# Patient Record
Sex: MALE | Race: WHITE | NOT HISPANIC OR LATINO | URBAN - METROPOLITAN AREA
[De-identification: names, ages, dates, MRNs, and addresses within clinical notes are randomized per-mention and may not be internally consistent; named-entity substitution may affect disease eponyms.]

---

## 2017-01-17 ENCOUNTER — COMMUNICATION - HEALTHEAST (OUTPATIENT)
Dept: NEUROLOGY | Facility: CLINIC | Age: 37
End: 2017-01-17

## 2017-01-17 ENCOUNTER — HOSPITAL ENCOUNTER (OUTPATIENT)
Dept: NEUROLOGY | Facility: CLINIC | Age: 37
Setting detail: THERAPIES SERIES
Discharge: STILL ADMITTED OR ADMINISTRATIVE | End: 2017-01-17
Attending: NURSE PRACTITIONER

## 2017-01-17 DIAGNOSIS — R11.2 NAUSEA AND VOMITING, INTRACTABILITY OF VOMITING NOT SPECIFIED, UNSPECIFIED VOMITING TYPE: ICD-10-CM

## 2017-01-17 DIAGNOSIS — R53.83 FATIGUE, UNSPECIFIED TYPE: ICD-10-CM

## 2017-01-17 DIAGNOSIS — H53.8 BLURRY VISION: ICD-10-CM

## 2017-01-17 DIAGNOSIS — F07.81 POSTCONCUSSION SYNDROME: ICD-10-CM

## 2017-01-17 DIAGNOSIS — F06.4 ANXIETY DISORDER DUE TO MEDICAL CONDITION: ICD-10-CM

## 2017-01-18 ENCOUNTER — HOSPITAL ENCOUNTER (OUTPATIENT)
Dept: NEUROLOGY | Facility: CLINIC | Age: 37
Setting detail: THERAPIES SERIES
Discharge: STILL ADMITTED OR ADMINISTRATIVE | End: 2017-01-18
Attending: NURSE PRACTITIONER

## 2017-01-18 DIAGNOSIS — S06.9X1S MILD NEUROCOGNITIVE DISORDER DUE TO TRAUMATIC BRAIN INJURY, WITH LOSS OF CONSCIOUSNESS OF 30 MINUTES OR LESS, SEQUELA: ICD-10-CM

## 2017-01-18 DIAGNOSIS — F07.81 POSTCONCUSSION SYNDROME: ICD-10-CM

## 2017-01-18 DIAGNOSIS — F43.23 ADJUSTMENT DISORDER WITH MIXED ANXIETY AND DEPRESSED MOOD: ICD-10-CM

## 2017-01-30 ENCOUNTER — HOSPITAL ENCOUNTER (OUTPATIENT)
Dept: PHYSICAL THERAPY | Age: 37
Setting detail: THERAPIES SERIES
Discharge: STILL ADMITTED OR ADMINISTRATIVE | End: 2017-01-30
Attending: NURSE PRACTITIONER

## 2017-01-30 ENCOUNTER — HOSPITAL ENCOUNTER (OUTPATIENT)
Dept: NEUROLOGY | Facility: CLINIC | Age: 37
Setting detail: THERAPIES SERIES
Discharge: STILL ADMITTED OR ADMINISTRATIVE | End: 2017-01-30

## 2017-01-30 ENCOUNTER — HOSPITAL ENCOUNTER (OUTPATIENT)
Dept: SPEECH THERAPY | Age: 37
Setting detail: THERAPIES SERIES
Discharge: STILL ADMITTED OR ADMINISTRATIVE | End: 2017-01-30
Attending: NURSE PRACTITIONER

## 2017-01-30 DIAGNOSIS — F07.81 POSTCONCUSSION SYNDROME: ICD-10-CM

## 2017-01-30 DIAGNOSIS — F43.23 ADJUSTMENT DISORDER WITH MIXED ANXIETY AND DEPRESSED MOOD: ICD-10-CM

## 2017-01-30 DIAGNOSIS — R47.89 WORD FINDING DIFFICULTY: ICD-10-CM

## 2017-02-06 ENCOUNTER — RECORDS - HEALTHEAST (OUTPATIENT)
Dept: ADMINISTRATIVE | Facility: OTHER | Age: 37
End: 2017-02-06

## 2017-02-08 ENCOUNTER — HOSPITAL ENCOUNTER (OUTPATIENT)
Dept: NEUROLOGY | Facility: CLINIC | Age: 37
Setting detail: THERAPIES SERIES
Discharge: STILL ADMITTED OR ADMINISTRATIVE | End: 2017-02-08
Attending: NURSE PRACTITIONER

## 2017-02-08 DIAGNOSIS — J45.909 ASTHMA: ICD-10-CM

## 2017-02-08 DIAGNOSIS — F06.4 ANXIETY DISORDER DUE TO MEDICAL CONDITION: ICD-10-CM

## 2017-02-08 DIAGNOSIS — R53.83 FATIGUE, UNSPECIFIED TYPE: ICD-10-CM

## 2017-02-08 DIAGNOSIS — J45.909 UNCOMPLICATED ASTHMA, UNSPECIFIED ASTHMA SEVERITY: ICD-10-CM

## 2017-02-08 DIAGNOSIS — F07.81 POSTCONCUSSION SYNDROME: ICD-10-CM

## 2017-03-10 ENCOUNTER — AMBULATORY - HEALTHEAST (OUTPATIENT)
Dept: INTERNAL MEDICINE | Facility: CLINIC | Age: 37
End: 2017-03-10

## 2017-03-10 ENCOUNTER — COMMUNICATION - HEALTHEAST (OUTPATIENT)
Dept: INTERNAL MEDICINE | Facility: CLINIC | Age: 37
End: 2017-03-10

## 2017-03-21 ENCOUNTER — AMBULATORY - HEALTHEAST (OUTPATIENT)
Dept: NEUROLOGY | Facility: CLINIC | Age: 37
End: 2017-03-21

## 2017-05-08 ENCOUNTER — COMMUNICATION - HEALTHEAST (OUTPATIENT)
Dept: INTERNAL MEDICINE | Facility: CLINIC | Age: 37
End: 2017-05-08

## 2017-05-08 DIAGNOSIS — J45.909 ASTHMA: ICD-10-CM

## 2017-05-25 ENCOUNTER — COMMUNICATION - HEALTHEAST (OUTPATIENT)
Dept: NEUROLOGY | Facility: CLINIC | Age: 37
End: 2017-05-25

## 2017-05-25 DIAGNOSIS — F32.A DEPRESSION, UNSPECIFIED DEPRESSION TYPE: ICD-10-CM

## 2017-07-10 ENCOUNTER — COMMUNICATION - HEALTHEAST (OUTPATIENT)
Dept: NEUROLOGY | Facility: CLINIC | Age: 37
End: 2017-07-10

## 2017-07-10 DIAGNOSIS — F32.A DEPRESSION, UNSPECIFIED DEPRESSION TYPE: ICD-10-CM

## 2017-07-13 ENCOUNTER — RECORDS - HEALTHEAST (OUTPATIENT)
Dept: ADMINISTRATIVE | Facility: OTHER | Age: 37
End: 2017-07-13

## 2017-07-18 ENCOUNTER — COMMUNICATION - HEALTHEAST (OUTPATIENT)
Dept: INTERNAL MEDICINE | Facility: CLINIC | Age: 37
End: 2017-07-18

## 2017-07-18 DIAGNOSIS — J45.909 ASTHMA: ICD-10-CM

## 2017-07-26 ENCOUNTER — OFFICE VISIT - HEALTHEAST (OUTPATIENT)
Dept: INTERNAL MEDICINE | Facility: CLINIC | Age: 37
End: 2017-07-26

## 2017-07-26 DIAGNOSIS — Z72.0 TOBACCO ABUSE: ICD-10-CM

## 2017-07-26 DIAGNOSIS — Z78.9 FEMALE-TO-MALE TRANSGENDER PERSON: ICD-10-CM

## 2017-07-26 DIAGNOSIS — M17.0 PRIMARY OSTEOARTHRITIS OF BOTH KNEES: ICD-10-CM

## 2017-07-26 DIAGNOSIS — F32.A DEPRESSION: ICD-10-CM

## 2017-07-26 DIAGNOSIS — J45.909 UNCOMPLICATED ASTHMA, UNSPECIFIED ASTHMA SEVERITY: ICD-10-CM

## 2017-07-26 ASSESSMENT — MIFFLIN-ST. JEOR: SCORE: 2001.71

## 2017-08-02 ENCOUNTER — COMMUNICATION - HEALTHEAST (OUTPATIENT)
Dept: INTERNAL MEDICINE | Facility: CLINIC | Age: 37
End: 2017-08-02

## 2017-08-07 ENCOUNTER — COMMUNICATION - HEALTHEAST (OUTPATIENT)
Dept: INTERNAL MEDICINE | Facility: CLINIC | Age: 37
End: 2017-08-07

## 2017-08-16 ENCOUNTER — RECORDS - HEALTHEAST (OUTPATIENT)
Dept: ADMINISTRATIVE | Facility: OTHER | Age: 37
End: 2017-08-16

## 2017-08-27 ENCOUNTER — COMMUNICATION - HEALTHEAST (OUTPATIENT)
Dept: INTERNAL MEDICINE | Facility: CLINIC | Age: 37
End: 2017-08-27

## 2017-08-27 DIAGNOSIS — J45.909 UNCOMPLICATED ASTHMA, UNSPECIFIED ASTHMA SEVERITY: ICD-10-CM

## 2017-09-15 ENCOUNTER — COMMUNICATION - HEALTHEAST (OUTPATIENT)
Dept: INTERNAL MEDICINE | Facility: CLINIC | Age: 37
End: 2017-09-15

## 2017-09-21 ENCOUNTER — COMMUNICATION - HEALTHEAST (OUTPATIENT)
Dept: INTERNAL MEDICINE | Facility: CLINIC | Age: 37
End: 2017-09-21

## 2017-09-28 ENCOUNTER — COMMUNICATION - HEALTHEAST (OUTPATIENT)
Dept: INTERNAL MEDICINE | Facility: CLINIC | Age: 37
End: 2017-09-28

## 2017-10-03 ENCOUNTER — COMMUNICATION - HEALTHEAST (OUTPATIENT)
Dept: INTERNAL MEDICINE | Facility: CLINIC | Age: 37
End: 2017-10-03

## 2017-10-03 DIAGNOSIS — J45.909 ASTHMA: ICD-10-CM

## 2017-10-07 ENCOUNTER — COMMUNICATION - HEALTHEAST (OUTPATIENT)
Dept: INTERNAL MEDICINE | Facility: CLINIC | Age: 37
End: 2017-10-07

## 2017-10-25 ENCOUNTER — COMMUNICATION - HEALTHEAST (OUTPATIENT)
Dept: INTERNAL MEDICINE | Facility: CLINIC | Age: 37
End: 2017-10-25

## 2017-10-25 ENCOUNTER — OFFICE VISIT - HEALTHEAST (OUTPATIENT)
Dept: INTERNAL MEDICINE | Facility: CLINIC | Age: 37
End: 2017-10-25

## 2017-10-25 DIAGNOSIS — R05.9 COUGH: ICD-10-CM

## 2017-10-25 DIAGNOSIS — J45.909 ASTHMA: ICD-10-CM

## 2017-10-25 DIAGNOSIS — J06.0 SORE THROAT AND LARYNGITIS: ICD-10-CM

## 2017-10-25 DIAGNOSIS — R11.0 NAUSEA: ICD-10-CM

## 2017-10-25 DIAGNOSIS — R09.82 POSTNASAL DRIP: ICD-10-CM

## 2017-11-02 ENCOUNTER — AMBULATORY - HEALTHEAST (OUTPATIENT)
Dept: INTERNAL MEDICINE | Facility: CLINIC | Age: 37
End: 2017-11-02

## 2017-11-02 ENCOUNTER — COMMUNICATION - HEALTHEAST (OUTPATIENT)
Dept: INTERNAL MEDICINE | Facility: CLINIC | Age: 37
End: 2017-11-02

## 2017-11-02 DIAGNOSIS — J45.901 ACUTE ASTHMA EXACERBATION: ICD-10-CM

## 2017-11-03 ENCOUNTER — AMBULATORY - HEALTHEAST (OUTPATIENT)
Dept: INTERNAL MEDICINE | Facility: CLINIC | Age: 37
End: 2017-11-03

## 2017-11-03 ENCOUNTER — OFFICE VISIT - HEALTHEAST (OUTPATIENT)
Dept: INTERNAL MEDICINE | Facility: CLINIC | Age: 37
End: 2017-11-03

## 2017-11-03 DIAGNOSIS — J45.901 ASTHMA EXACERBATION: ICD-10-CM

## 2017-11-03 DIAGNOSIS — J06.9 URI WITH COUGH AND CONGESTION: ICD-10-CM

## 2017-11-03 ASSESSMENT — MIFFLIN-ST. JEOR: SCORE: 2036.64

## 2017-11-06 ENCOUNTER — COMMUNICATION - HEALTHEAST (OUTPATIENT)
Dept: INTERNAL MEDICINE | Facility: CLINIC | Age: 37
End: 2017-11-06

## 2017-11-06 ENCOUNTER — RECORDS - HEALTHEAST (OUTPATIENT)
Dept: ADMINISTRATIVE | Facility: OTHER | Age: 37
End: 2017-11-06

## 2017-11-10 ENCOUNTER — COMMUNICATION - HEALTHEAST (OUTPATIENT)
Dept: INTERNAL MEDICINE | Facility: CLINIC | Age: 37
End: 2017-11-10

## 2017-11-10 ENCOUNTER — AMBULATORY - HEALTHEAST (OUTPATIENT)
Dept: INTERNAL MEDICINE | Facility: CLINIC | Age: 37
End: 2017-11-10

## 2017-11-10 DIAGNOSIS — J45.909 ASTHMA: ICD-10-CM

## 2018-01-13 ENCOUNTER — COMMUNICATION - HEALTHEAST (OUTPATIENT)
Dept: INTERNAL MEDICINE | Facility: CLINIC | Age: 38
End: 2018-01-13

## 2018-01-13 DIAGNOSIS — J45.909 ASTHMA: ICD-10-CM

## 2018-02-05 ENCOUNTER — COMMUNICATION - HEALTHEAST (OUTPATIENT)
Dept: INTERNAL MEDICINE | Facility: CLINIC | Age: 38
End: 2018-02-05

## 2018-02-06 ENCOUNTER — COMMUNICATION - HEALTHEAST (OUTPATIENT)
Dept: INTERNAL MEDICINE | Facility: CLINIC | Age: 38
End: 2018-02-06

## 2018-02-06 ENCOUNTER — OFFICE VISIT - HEALTHEAST (OUTPATIENT)
Dept: INTERNAL MEDICINE | Facility: CLINIC | Age: 38
End: 2018-02-06

## 2018-02-06 DIAGNOSIS — Z86.69 HISTORY OF MIGRAINE HEADACHES: ICD-10-CM

## 2018-02-06 DIAGNOSIS — M17.0 PRIMARY OSTEOARTHRITIS OF BOTH KNEES: ICD-10-CM

## 2018-02-06 DIAGNOSIS — E11.9 T2DM (TYPE 2 DIABETES MELLITUS) (H): ICD-10-CM

## 2018-02-06 DIAGNOSIS — J45.909 ASTHMA: ICD-10-CM

## 2018-02-06 DIAGNOSIS — G43.819 OTHER MIGRAINE WITHOUT STATUS MIGRAINOSUS, INTRACTABLE: ICD-10-CM

## 2018-02-06 LAB — HBA1C MFR BLD: 9.4 % (ref 3.5–6)

## 2018-02-11 ENCOUNTER — COMMUNICATION - HEALTHEAST (OUTPATIENT)
Dept: INTERNAL MEDICINE | Facility: CLINIC | Age: 38
End: 2018-02-11

## 2018-02-23 ENCOUNTER — OFFICE VISIT - HEALTHEAST (OUTPATIENT)
Dept: INTERNAL MEDICINE | Facility: CLINIC | Age: 38
End: 2018-02-23

## 2018-02-23 ENCOUNTER — COMMUNICATION - HEALTHEAST (OUTPATIENT)
Dept: SURGERY | Facility: CLINIC | Age: 38
End: 2018-02-23

## 2018-02-23 DIAGNOSIS — J45.909 ASTHMA: ICD-10-CM

## 2018-02-23 DIAGNOSIS — E11.9 TYPE 2 DIABETES MELLITUS WITHOUT COMPLICATION, WITHOUT LONG-TERM CURRENT USE OF INSULIN (H): ICD-10-CM

## 2018-02-23 LAB
CREAT UR-MCNC: 96.3 MG/DL
MICROALBUMIN UR-MCNC: 0.68 MG/DL (ref 0–1.99)
MICROALBUMIN/CREAT UR: 7.1 MG/G

## 2018-02-28 ENCOUNTER — COMMUNICATION - HEALTHEAST (OUTPATIENT)
Dept: INTERNAL MEDICINE | Facility: CLINIC | Age: 38
End: 2018-02-28

## 2018-03-02 ENCOUNTER — AMBULATORY - HEALTHEAST (OUTPATIENT)
Dept: EDUCATION SERVICES | Facility: CLINIC | Age: 38
End: 2018-03-02

## 2018-03-02 ENCOUNTER — COMMUNICATION - HEALTHEAST (OUTPATIENT)
Dept: ADMINISTRATIVE | Facility: CLINIC | Age: 38
End: 2018-03-02

## 2018-03-05 ENCOUNTER — COMMUNICATION - HEALTHEAST (OUTPATIENT)
Dept: INTERNAL MEDICINE | Facility: CLINIC | Age: 38
End: 2018-03-05

## 2018-03-05 DIAGNOSIS — F32.A DEPRESSION: ICD-10-CM

## 2018-05-15 ENCOUNTER — COMMUNICATION - HEALTHEAST (OUTPATIENT)
Dept: INTERNAL MEDICINE | Facility: CLINIC | Age: 38
End: 2018-05-15

## 2018-05-15 DIAGNOSIS — J45.909 ASTHMA: ICD-10-CM

## 2018-06-18 ENCOUNTER — COMMUNICATION - HEALTHEAST (OUTPATIENT)
Dept: INTERNAL MEDICINE | Facility: CLINIC | Age: 38
End: 2018-06-18

## 2018-06-25 ENCOUNTER — COMMUNICATION - HEALTHEAST (OUTPATIENT)
Dept: INTERNAL MEDICINE | Facility: CLINIC | Age: 38
End: 2018-06-25

## 2018-06-26 ENCOUNTER — COMMUNICATION - HEALTHEAST (OUTPATIENT)
Dept: INTERNAL MEDICINE | Facility: CLINIC | Age: 38
End: 2018-06-26

## 2018-06-29 ENCOUNTER — COMMUNICATION - HEALTHEAST (OUTPATIENT)
Dept: INTERNAL MEDICINE | Facility: CLINIC | Age: 38
End: 2018-06-29

## 2018-07-18 ENCOUNTER — COMMUNICATION - HEALTHEAST (OUTPATIENT)
Dept: INTERNAL MEDICINE | Facility: CLINIC | Age: 38
End: 2018-07-18

## 2018-07-25 ENCOUNTER — OFFICE VISIT - HEALTHEAST (OUTPATIENT)
Dept: INTERNAL MEDICINE | Facility: CLINIC | Age: 38
End: 2018-07-25

## 2018-07-25 DIAGNOSIS — E11.9 TYPE 2 DIABETES MELLITUS WITHOUT COMPLICATION, WITH LONG-TERM CURRENT USE OF INSULIN (H): ICD-10-CM

## 2018-07-25 DIAGNOSIS — J45.909 ASTHMA: ICD-10-CM

## 2018-07-25 DIAGNOSIS — T78.40XD ALLERGIC REACTION, SUBSEQUENT ENCOUNTER: ICD-10-CM

## 2018-07-25 DIAGNOSIS — E66.01 MORBID OBESITY (H): ICD-10-CM

## 2018-07-25 DIAGNOSIS — Z78.9 FEMALE-TO-MALE TRANSGENDER PERSON: ICD-10-CM

## 2018-07-25 DIAGNOSIS — Z79.4 TYPE 2 DIABETES MELLITUS WITHOUT COMPLICATION, WITH LONG-TERM CURRENT USE OF INSULIN (H): ICD-10-CM

## 2018-07-25 LAB — HBA1C MFR BLD: 7.2 % (ref 3.5–6)

## 2018-07-26 ENCOUNTER — COMMUNICATION - HEALTHEAST (OUTPATIENT)
Dept: INTERNAL MEDICINE | Facility: CLINIC | Age: 38
End: 2018-07-26

## 2018-07-26 DIAGNOSIS — E29.1 HYPOGONADISM MALE: ICD-10-CM

## 2018-07-30 ENCOUNTER — OFFICE VISIT - HEALTHEAST (OUTPATIENT)
Dept: ALLERGY | Facility: CLINIC | Age: 38
End: 2018-07-30

## 2018-07-30 DIAGNOSIS — T78.2XXA ANAPHYLACTIC REACTION: ICD-10-CM

## 2018-07-30 DIAGNOSIS — R23.2 FLUSHING: ICD-10-CM

## 2018-07-30 LAB — TSH SERPL DL<=0.005 MIU/L-ACNC: 1.08 UIU/ML (ref 0.3–5)

## 2018-07-30 ASSESSMENT — MIFFLIN-ST. JEOR: SCORE: 1959.53

## 2018-08-01 LAB — TRYPTASE - HISTORICAL: 4.7 UG/L

## 2018-08-03 ENCOUNTER — COMMUNICATION - HEALTHEAST (OUTPATIENT)
Dept: ALLERGY | Facility: CLINIC | Age: 38
End: 2018-08-03

## 2018-08-03 LAB
ARUP MISCELLANEOUS TEST: NORMAL
MISCELLANEOUS TEST DEPT. - HE HISTORICAL: NORMAL
PERFORMING LAB: NORMAL
SPECIMEN STATUS: NORMAL
TEST NAME: NORMAL

## 2018-08-07 ENCOUNTER — COMMUNICATION - HEALTHEAST (OUTPATIENT)
Dept: SCHEDULING | Facility: CLINIC | Age: 38
End: 2018-08-07

## 2018-08-08 ENCOUNTER — COMMUNICATION - HEALTHEAST (OUTPATIENT)
Dept: INTERNAL MEDICINE | Facility: CLINIC | Age: 38
End: 2018-08-08

## 2018-08-15 ENCOUNTER — AMBULATORY - HEALTHEAST (OUTPATIENT)
Dept: LAB | Facility: CLINIC | Age: 38
End: 2018-08-15

## 2018-08-15 DIAGNOSIS — R23.2 FLUSHING: ICD-10-CM

## 2018-08-15 DIAGNOSIS — T78.2XXA ANAPHYLACTIC REACTION: ICD-10-CM

## 2018-08-18 LAB
5HIAA & CREATININE UR-IMP: NORMAL
5OH-INDOLEACETATE 24H UR-MCNC: 2.8 MG/L
5OH-INDOLEACETATE 24H UR-MRATE: 2 MG/D (ref 0–15)
5OH-INDOLEACETATE/CREAT 24H UR: 2 MG/GCR (ref 0–14)
COLLECT DURATION TIME SPEC: 24 HR
CREAT 24H UR-MRATE: 1043 MG/D (ref 1000–2500)
CREAT UR-MCNC: 149 MG/DL
SPECIMEN VOL ?TM UR: 700 ML

## 2018-08-19 ENCOUNTER — COMMUNICATION - HEALTHEAST (OUTPATIENT)
Dept: ALLERGY | Facility: CLINIC | Age: 38
End: 2018-08-19

## 2018-08-21 LAB
MISCELLANEOUS TEST DEPT. - HE HISTORICAL: NORMAL
PERFORMING LAB: NORMAL
SPECIMEN STATUS: NORMAL
TEST NAME: NORMAL

## 2018-08-22 ENCOUNTER — COMMUNICATION - HEALTHEAST (OUTPATIENT)
Dept: ALLERGY | Facility: CLINIC | Age: 38
End: 2018-08-22

## 2018-08-27 ENCOUNTER — COMMUNICATION - HEALTHEAST (OUTPATIENT)
Dept: INTERNAL MEDICINE | Facility: CLINIC | Age: 38
End: 2018-08-27

## 2018-09-26 ENCOUNTER — COMMUNICATION - HEALTHEAST (OUTPATIENT)
Dept: INTERNAL MEDICINE | Facility: CLINIC | Age: 38
End: 2018-09-26

## 2018-09-26 DIAGNOSIS — J45.909 ASTHMA: ICD-10-CM

## 2018-09-27 ENCOUNTER — COMMUNICATION - HEALTHEAST (OUTPATIENT)
Dept: INTERNAL MEDICINE | Facility: CLINIC | Age: 38
End: 2018-09-27

## 2018-09-28 ENCOUNTER — COMMUNICATION - HEALTHEAST (OUTPATIENT)
Dept: INTERNAL MEDICINE | Facility: CLINIC | Age: 38
End: 2018-09-28

## 2019-04-04 ENCOUNTER — COMMUNICATION - HEALTHEAST (OUTPATIENT)
Dept: INTERNAL MEDICINE | Facility: CLINIC | Age: 39
End: 2019-04-04

## 2021-05-27 NOTE — TELEPHONE ENCOUNTER
Patient Returning Call  Reason for call:  Returning call from clinic  Information relayed to patient:  Due for diabetic check. Patient has moved to [place] and will be switching care elsewhere. Does not have new PCP yet but will get one eventually as [facility] is now too far for him.   Patient has additional questions:  No  If YES, what are your questions/concerns:  N/A  Okay to leave a detailed message?: No call back needed

## 2021-05-30 VITALS — WEIGHT: 248 LBS | BODY MASS INDEX: 40.03 KG/M2

## 2021-05-30 VITALS — WEIGHT: 247 LBS | BODY MASS INDEX: 39.87 KG/M2

## 2021-05-31 VITALS — BODY MASS INDEX: 41.95 KG/M2 | WEIGHT: 261 LBS | HEIGHT: 66 IN

## 2021-05-31 VITALS — BODY MASS INDEX: 42.14 KG/M2 | WEIGHT: 261.1 LBS

## 2021-05-31 VITALS — WEIGHT: 253.3 LBS | BODY MASS INDEX: 40.71 KG/M2 | HEIGHT: 66 IN

## 2021-06-01 VITALS — BODY MASS INDEX: 40.87 KG/M2 | WEIGHT: 253.19 LBS

## 2021-06-01 VITALS — BODY MASS INDEX: 39.21 KG/M2 | HEIGHT: 66 IN | WEIGHT: 244 LBS

## 2021-06-01 VITALS — WEIGHT: 255 LBS | BODY MASS INDEX: 41.16 KG/M2

## 2021-06-01 VITALS — BODY MASS INDEX: 41 KG/M2 | WEIGHT: 254 LBS

## 2021-06-01 VITALS — WEIGHT: 244 LBS | BODY MASS INDEX: 39.38 KG/M2

## 2021-06-08 NOTE — PROGRESS NOTES
Assessment:     1. Concussion, without loss of consciousness.    2. Post concussion syndrome  3. Dizziness  4.  Headaches    Plan:     Cognitive Impairment- Neuropsychological assessment (2 hours), rest, slow return to work, MRI, labs, start Wellbutrin  Pain control for headaches - Tylenol only due to rebound headaches  Dizziness and headache - PT to evaluate and treat  Vision abnormalities - PT to evaluate and treat, referral to opthalmology  Word Finding Problems- ST to evaluate and treat   Sleeping Problems-monitor, sleep hygiene, increase amitriptyline  Anxiety/depression - start Wellbutrin, referral to psychology  Return to Work- gradual, see note    Subjective:          Emily Chapin, prefers to be called Wilmer, is a 36 y.o. male who presents with a blunt/closed head injury which he sustained on 10/8/16.he went to [facility] with his nephew for the [facility] and while in the haunted house he tripped over a child in the dark. One of the employees tried to catch him but accidentally knocked over a large metal barrel that hit him squarely on the head. He affirms losing consciousness but thinks he woke within a few seconds, though he does not remember the incident at all.  He felt okay for the rest of the night though his head felt a bit sore. He felt a bit nauseous and unwell on Sunday with a mild headache. About 4 days later he presented to an urgency room after he had been feeling nauseous and vomiting frequently where he had a CT scan that revealed nothing of note. He was diagnosed with a concussion at the urgency room. Since he was discharged from the urgency room he has felt very poorly. He reports sleeping far more than usual, though he has difficulty falling asleep. Once he does sleep he will sleep until someone wakes him. He has fallen asleep at his desk at work, even after taking a week off to recuperate. He has been mixing up words and has daily head aches. He describes the headaches as  "splitting pain down the middle of his head with pain also over right crown of his head, and particular pain behind his right eye. However, the area over where he was hit does not feel terrible but he has a dent where he was hit. He states that his mood has been very capricious and he states that he has been irritable at work. He states that he can hear himself being unkind to customers He is worried about missing more work and his  has been on vacation. He denies any dizziness but when he tries to go to sleep he feels a \"shrinking sensation\" as if he is getting small as he is going to sleep. The sensation abates slowly if he opens his eyes. He used to have this sensation when he was a child but has not had it since then. Of note, he has been dreaming far more frequently and having some trouble distinguishing between what he dreamt and reality, providing an example of trying to feed his cat until he realized he did not have a cat. He has been taking OTC motion sickness medication with only minimal relief. He was prescribed Zofran by the urgent care room but could not afford it. He reports feeling sensitive to sound but denies any sensitivity to light. He endorses feeling down and depressed, as well as anxious. He has been crying without apparent cause and trembles when doing so  He denies any self harmful ideation and has no history of depression or anxiety. The point of impact was top right side of his head .He has had 0 previous head injuries. With regard to cognitive symptoms, he  endorsed significant ongoing concerns with his memory and that he has difficulties with attention and concentration as well as slowed thinking. In terms of emotional symptoms, he noted that in general he feels more emotional. He did acknowledge becoming more easily irritated and frustrated, he also reports feeling more sadness and nervousness. Finally, with regard to physical symptoms, he endorsed significant ongoing " "headaches noting that he has headaches intermittently, but does have a HA every day. He indicated that approximately twice a week  these headaches are particularly bad such that he would rate them as a 7 on a 1-10 rating scale. Most other days, he would describe his headaches as being at about a 3/10. At his best his headaches are a 0/10.  The patient reports screens, TV, phone brings on his headache symptoms, noise makes his symptoms worse, and rest, quiet room makes his symptoms better.. He indicated that he takes ibuprofen (Advil) and does help, but not always. He stated also that he experiences  nausea without vomiting, balance problems (no additional falls), worsen eye site, as well as blurred vision, fatigue, sensitivity to light and noise.. He also described sleep disturbance. He experiences drowsiness and is sleeping more than usual and has difficulty falling asleep and when he wakes he does not feel rested    Patient was referred to the concussion clinic by his primary. The patient was born in [place] and has lived here for most of his life. He is currently living  alone with his cat  in an apartment. 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 The concussion symptoms are limiting his ability to work, hard for him to figure out \"what he is spoze to be doing\" can't concentrate, severe HAs, fatigue, and word finding problems. His concussion is not work related. Personal interests include playing board games, drawing, and reading. The patient has been able to perform some of these activities since his injury. He normally does not exercise . He is a present smoker who smokes half pack a day and has for 10 years, never drinks, and does not drink products with caffeine. He is currently driving, concussion is not affecting his driving. His education includes GED and some college..He reports having average grades " through high school and college. He reports learning best by doing. He denies any developmental problems, learning disabilities, or history of ADHD. He does not have any culture or Yarsanism concerns regarding his treatment. The patient denies being a victim of physical, emotional, financial, or sexual abuse. There is not  currently any evidence of a blow to the head.     The patient did see his primary after the injury, who instructed the patient to not work for 2 weeks then half days. The patient reports that his activities since the injury have been resting he was out of work for 3 weeks.. The patient denies any unexplained weight loss or gain. The patient reports that the symptoms wake him up at night. He reports feeling down, depressed, and hopeless. The patient reports being bothered by having little interest or pleasure in doing things. He denies being currently pregnant or thinking he might be pregnant (patient is transsexual and on testosterone but has not had any surgery)    Physical Symptoms:  Headache-Yes  Nausea- Yes  Vomiting - Yes  Balance problems - Yes, resolved  Dizziness - Yes, resolved  Visual problems - Yes, vision gets blurry  Fatigue - Yes  Sensitivity to light - No  Sensitivity to sound - Yes  Numbness/tingling - No      Cognitive Symptoms  Feeling mentally foggy - Yes  Feeling slowed down - Yes  Difficulty Concentrating- Yes  Difficulty remembering - Yes    Emotional Symptoms  Irritability - Yes  Sadness- Yes  More emotional - Yes  Nervousness/anxiety - Yes      Psychiatric History:  Anxiety - No  Depression - No  Sleep Disorders - Yes, sleep apnea uses CPAP machine    Family Psychiatric History:  none      Sleep History:  Drowsiness- Yes  Sleep less than usual - No  Sleep more than usual - Yes  Trouble falling asleep - Yes  Does the patient wake feeling rested - No      Previous concussions  No       Headaches  The patient does not have any history of migraines, does state that his mother  does have a history of migraines    Patient Active Problem List    Diagnosis Date Noted     Postconcussion syndrome 10/20/2016     No past medical history on file.  No past surgical history on file.  No family history on file.  Current Outpatient Prescriptions   Medication Sig Dispense Refill     albuterol sulfate 90 mcg/actuation AePB Inhale 180 mcg every 4 (four) hours as needed.       amitriptyline (ELAVIL) 25 MG tablet Take 1 tablet (25 mg total) by mouth bedtime. 30 tablet 3     ibuprofen (ADVIL,MOTRIN) 200 MG tablet Take 200 mg by mouth every 6 (six) hours as needed for pain.       mometasone-formoterol (DULERA) 200-5 mcg/actuation HFAA inhaler Inhale 2 puffs 2 (two) times a day.       RANITIDINE HCL (ZANTAC ORAL) Take by mouth as needed.       TESTOSTERONE CYPIONATE IM Inject into the shoulder, thigh, or buttocks. Every 2 weeks       No current facility-administered medications for this encounter.        Allergies   Allergen Reactions     Morphine Nausea And Vomiting     Prednisone Anxiety     Social History     Social History     Marital status: Single     Spouse name: N/A     Number of children: N/A     Years of education: N/A     Occupational History     Not on file.     Social History Main Topics     Smoking status: Current Every Day Smoker     Types: Cigarettes     Smokeless tobacco: Not on file      Comment: 1/2 PACK PER DAY     Alcohol use Not on file     Drug use: Not on file     Sexual activity: Not on file     Other Topics Concern     Not on file     Social History Narrative     No narrative on file       The following portions of the patient's history were reviewed and updated as appropriate: allergies, current medications, past family history, past medical history, past social history, past surgical history and problem list.    Review of Systems  A comprehensive review of systems was negative except for: Neurological: positive for headaches and fatigue       Objective:     Cranial Nerve Exam:  I -  intact      II - intact      III- intact      IV - intact      V - intact      VI - intact      VII- intact     VIII - intact      IX - intact     X- intact     XI intact     XII - intact      Neuro Exam:  Sensation -equal/intact     Muscle strength - equal/intact  Gait- normal    Abstract thinking - intact  Thinking ability was intact   Romberg test-  positive    Vitals:    01/17/17 0809   BP: 128/82   Pulse: 93   Weight: (!) 247 lb (112 kg)       Exam: On examination, he is awake and alert with no aphasia and no dysarthria. He is oriented X 3 and shows good remote and recall. Attention is fine. Cranial nerves II through XII are tested and intact. Fundi are normal and there is no carotid bruit. There is no pronator drift and no focal or lateralized weakness in the extremities. Finger-nose-finger testing, fine finger movements, and rapid alternating movements are normal on both sides. Tone and sensation are fine. Reflexes are normal and symmetric throughout. He is able to walk on his heels and toes just fine. .     Imaging:  CT Head: Reviewed    Discussion was held with the patient today regarding concussion in general including types of injury, symptoms that are common, treatment and variability in time to recover. I also provided written information about concussion and symptoms that would apply to him in his AVS paperwork. Education about concussion symptoms and length of time it would take the patient to recover was also given to the patient.  I have reassured the patient his symptoms are very common when a concussion is present and will improve with time. I asked him to call with any questions or concerns and will see him again in clinic in about 2   weeks. We discussed the risks and benefits of the medication including risk of worsening depression with medication adjustments and even the possibility of emergence of suicidality      Total time spent with the patient today was 65 minutes with greater than 50% of  the time spent in counseling and care coordination.     Physical Exam: General appearance: alert, appears stated age, cooperative and no distress. No sensitivity to light.  Head: Normocephalic, without obvious abnormality, atraumatic  Neck: Full ROM with some pain  Neurologic: Mental status: Alert, oriented, thought content appropriate, affect: mood-congruent. Recent and remote memory grossly intact. Speech is clear and fluent with no obvious word finding or paraphasic errors. Pupils are equal and react to light direct and consensual accommodation. EOMs are intact without nystagmus. No exophoria/exotropia noted. Visual fields are grossly full. VOR grossly intact. Saccade and smooth pursuit grossly intact. Full facial movements, tongue protrudes midline soft palate rises symmetrically. Shoulder shrug is intact. Strength is 5/5, No pronator drift. Accurate finger to nose and sensation is intact to double simultaneous stimulation. Reflexes are symmetrical, toes are down going. Romberg is negative.He is able to toe, heel, and tandem walk without difficulty.      Mental Status Examination  Patient is casually dressed and seated for evaluation. He is cooperative with questioning and eye contact is good. He is fully engaged in conversation today. He is alert and fully oriented. Speech is normal. Thought processes normal with normal prehension and expression. Thoughts are organized and linear. Content is pertinent to the conversation and without evidence of auditory or visual hallucinations. No delusional ideation. Affect/mood is euthymic-bright, even. Gen. fund of knowledge, insight and memory are normal

## 2021-06-08 NOTE — PROGRESS NOTES
How have you been doing since we last saw you? any concerns?( new pt. Ask how concussion happen?) injure 10/8/2016 @ [facility]. Having ongoing headaches and memory issue.       Current Symptoms : Yes

## 2021-06-08 NOTE — PROGRESS NOTES
"Physical Therapy  PHYSICAL THERAPY INITIAL CONCUSSION ASSESSMENT    Pt goes by Wilmer    Date: 1/30/2017                        Date of Injury: 10/8/2016  Subjective: had been visiting [place] in October with his sister and nephew (per records October 8th) when he \"tripped over a small child,\" tried to grab onto a nearby barrel to steady himself but still fell to the ground and the empty metal barrel fell on top of him striking the top of his head. His sister indicated that he sustained a brief loss of consciousness (less than 30 seconds) and when he came to, he indicated that initially his head hurt, but that he otherwise felt \"fine\". However, he described later that evening experiencing a \"massive headache.\"     As symptoms persisted, Wilmer presented to urgent care on October 13th and a CT head scan was conducted at the time and was read as unremarkable (per Care Everywhere). He was told to follow up with his primary care provider which he did on October 20th. At that time, amitriptyline and Reglan were prescribed for headaches and nausea respectively. His PCP also recommended follow-up in the [facility] concussion clinic Wilmer stated that his doctor then recommended that he be held off work for 2 weeks followed by a period of part-time work at 4 hours a day. However, when he attempted to return after 2 weeks, he was told that his job was no longer available. As, at this point he no longer had health insurance, he was not able to follow-up in our concussion clinic until recently.  Pt currently works at a call center as a .  Pt was FT but recently Carmita modified his work scheduled to 6hrs/day, 4 days/wk.  Pt feels this is helping and feels more rested with the ability to concentrate better when at work.    Ongoing symptoms:  Headaches and nausea    Headaches:  Constant.  Located along the sagittal suture line of the head that pierces down the middle of the face and cuts off to the " "right by the jaw line.  Describes pain as \"head is going to pop like a popcorn kernel\".  Exacerbating factors:  Sound, noise, light, screen time.  Relieving factors:  Pain meds (tyelonol), sleep, rest.  Current:  1/10, Best:  1/10, Worst:  7/10  Cervical:  Denies  Dizzy:  No significant complaints, \"maybe once ever in a great while.\"  Balance:  No complaints    Pain rating: See above  Location: See above   Shoulder Clear? No  Other information/data: PMH that includes DM type II, suzette knee OA (already has been told he needs suzette TKA due to \"bone on bone\"), YULIET.  Pt is transgender and had a total hysterectomy.      ROM: Cervical       [x] WFL; pt noted stiffness with R side bending but no pain.  AROM is WFL.    Cervical and Thoracic Segmental Mobility/Other:  NT; not indicated  Postural Assessment: Rounded shoulders, increased muscle/tissue mass around shoulders and neck    Vestibular/Balance  Gait:Normal      Vertebral Basilar Artery: NT; not indicated   Ocular AROM: Normal  Smooth Pursuit: Normal  Saccades: Normal    Positional Tests: R Dee-Hallpike NT; not indicated                                              L Dee--Hallpike NT; indicated      Convergence: 9 cm  VOR Cancellation: Normal  Slow VOR: Normal  Right Head Impulse Test: Normal  Left Head Impulse Test: Normal    Sensory Organization Tests:  MCTSIB: NSEO Normal       PSEO Normal       NSEC Normal                  PSEC Normal    (Functional Gait Assessment) FGA: Not Tested            Initial Treatment: Pt reviewed findings of PT eval with pt and discussed that there does not appear to be a musculoskeletal presentation to the pt's HA and thus, does not warrant 1:1 PT for his complaints.  Pt verbalized understanding.    Therapist Recommendations:  Screen WFL; no need for 1:1 PT. Discharge Patient      Rx Units EVAL  Total Minutes: 35                                              "

## 2021-06-08 NOTE — PROGRESS NOTES
Assessment:     1. Concussion, without loss of consciousness.    2. Post concussion syndrome  3. Dizziness  4.  Headaches    Plan:     Cognitive Impairment- continue with Wellbutrin, structured day  Pain control for headaches - Tylenol only due to rebound headaches  Dizziness and headache - discharged by PT  Vision abnormalities -  referral to opthalmology  Sleeping Problems-monitor, sleep hygiene,  amitriptyline  Anxiety/depression - continue Wellbutrin, start Venlafaxine, continue with psychology  Return to Work- gradual, see note    The patient returns to the concussion clinic for a follow up visit, he was last seen by me on 1/17/17, I started the patient on Wellbutrin and Amitriptyline. The patient did admit himself to the ER, due to suicidal thoughts. The patient did loose his job due to his concussion symptoms. He reports that he no longer has these thoughts. I did discuss with the patient that we are not a mental health clinic, we are only a concussion clinic. The patient verbalized understanding. He reports his HAs are not as bad, they are no longer continuous. The patient is transsexual and has not been taking his hormones, which maybe contributing to his symptoms. I did start the patient on Venlafaxine. The patient also reports that he will see his endodontologist for his hormones. I did lower his amitriptyline due to grogginess. The patient is still working with psychology. The patient is denying suicidal ideations, I did discuss with the patient how he should go to an ER if ever having these thoughts, patient verbalized understanding.     Subjective:          Emily Chapin, prefers to be called [facility], is a 36 y.o. male who initially presented to the concussion clinic with a blunt/closed head injury which he sustained on 10/8/16, he went to [facility] with his nephew for the [facility] extravagance and while in the haunted house he tripped over a child in the dark. One of the employees tried to catch  "him but accidentally knocked over a large metal barrel that hit him squarely on the head. He affirms losing consciousness but thinks he woke within a few seconds, though he does not remember the incident at all.  He felt okay for the rest of the night though his head felt a bit sore. He felt a bit nauseous and unwell on Sunday with a mild headache. About 4 days later he presented to an urgency room after he had been feeling nauseous and vomiting frequently where he had a CT scan that revealed nothing of note. He was diagnosed with a concussion at the urgency room. Since he was discharged from the urgency room he has felt very poorly. He reports sleeping far more than usual, though he has difficulty falling asleep. Once he does sleep he will sleep until someone wakes him. He has fallen asleep at his desk at work, even after taking a week off to recuperate. He has been mixing up words and has daily head aches. He describes the headaches as splitting pain down the middle of his head with pain also over right crown of his head, and particular pain behind his right eye. However, the area over where he was hit does not feel terrible but he has a dent where he was hit. He states that his mood has been very capricious and he states that he has been irritable at work. He states that he can hear himself being unkind to customers He is worried about missing more work and his  has been on vacation. He denies any dizziness but when he tries to go to sleep he feels a \"shrinking sensation\" as if he is getting small as he is going to sleep. The sensation abates slowly if he opens his eyes. He used to have this sensation when he was a child but has not had it since then. He had been dreaming far more frequently and having some trouble distinguishing between what he dreamt and reality, providing an example of trying to feed his cat until he realized he did not have a cat. He had been taking OTC motion sickness " medication with only minimal relief. He was prescribed Zofran by the urgent care room but could not afford it. He reports feeling sensitive to sound but denies any sensitivity to light. He endorses feeling down and depressed, as well as anxious. He has been crying without apparent cause and trembles when doing so  He denies any self harmful ideation and has no history of depression or anxiety. The point of impact was top right side of his head .No previous head injuries. Cognitive symptoms, ongoing concerns with his memory, difficulties with attention and concentration, slowed thinking. Emotional symptoms, felt more emotional, more easily irritated and frustrated, feeling more sadness and nervousness. He had HAs intermittently, but did have a HA every day, twice a week  these HAs were at their worst 7/10, average 3/10, best 0/10.  The patient reports screens, TV, and phone brings on his HAs, noise makes his HAs worse, and rest, quiet room made his HAs better.. He indicated that he takes ibuprofen (Advil) and does help, but not always. He stated also that he experiences  nausea without vomiting, balance problems (no additional falls), worsen eye site, as well as blurred vision, fatigue, sensitivity to light and noise.. He also described sleep disturbance. He experiences drowsiness and is sleeping more than usual and has difficulty falling asleep and when he wakes he does not feel rested        Patient Active Problem List    Diagnosis Date Noted     Postconcussion syndrome 10/20/2016     No past medical history on file.  No past surgical history on file.  No family history on file.  Current Outpatient Prescriptions   Medication Sig Dispense Refill     albuterol sulfate 90 mcg/actuation AePB Inhale 180 mcg every 4 (four) hours as needed. 180 each 3     buPROPion (WELLBUTRIN XL) 150 MG 24 hr tablet Take 1 tablet (150 mg total) by mouth daily. 31 tablet 1     diclofenac sodium (VOLTAREN) 1 % Gel PJ 4 GRAMS TO KNEE QID  PRN P.  0     HYDROcodone-acetaminophen 5-325 mg per tablet Take 1 tablet by mouth.       ibuprofen (ADVIL,MOTRIN) 200 MG tablet Take 200 mg by mouth every 6 (six) hours as needed for pain.       mometasone-formoterol (DULERA) 200-5 mcg/actuation HFAA inhaler Inhale 2 puffs 2 (two) times a day.       RANITIDINE HCL (ZANTAC ORAL) Take by mouth as needed.       TESTOSTERONE CYPIONATE IM Inject into the shoulder, thigh, or buttocks. Every 2 weeks       VENTOLIN HFA 90 mcg/actuation inhaler INL 2 PFS PO Q 4 H PRN  2     amitriptyline (ELAVIL) 10 MG tablet Take 1 tablet (10 mg total) by mouth bedtime. 30 tablet 3     venlafaxine (EFFEXOR XR) 37.5 MG 24 hr capsule Take 1 capsule (37.5 mg total) by mouth daily. 60 capsule 1     No current facility-administered medications for this encounter.        Allergies   Allergen Reactions     Morphine Nausea And Vomiting     Prednisone Anxiety     Social History     Social History     Marital status: Single     Spouse name: N/A     Number of children: N/A     Years of education: N/A     Occupational History     Not on file.     Social History Main Topics     Smoking status: Current Every Day Smoker     Types: Cigarettes     Smokeless tobacco: Not on file      Comment: 1/2 PACK PER DAY     Alcohol use Not on file     Drug use: Not on file     Sexual activity: Not on file     Other Topics Concern     Not on file     Social History Narrative     No narrative on file       The following portions of the patient's history were reviewed and updated as appropriate: allergies, current medications, past family history, past medical history, past social history, past surgical history and problem list.    Review of Systems  A comprehensive review of systems was negative except for: Neurological: positive for headaches and fatigue       Objective:     Neuro Exam:  Sensation -equal/intact     Muscle strength - equal/intact  Gait- normal    Abstract thinking - intact  Thinking ability was intact    Romberg test-  positive    Vitals:    02/08/17 1256   BP: 116/63   Pulse: 87   Weight: (!) 248 lb (112.5 kg)         Discussion was held with the patient today regarding concussion in general including types of injury, symptoms that are common, treatment and variability in time to recover. I also provided written information about concussion and symptoms that would apply to him in his AVS paperwork. Education about concussion symptoms and length of time it would take the patient to recover was also given to the patient.  I have reassured the patient his symptoms are very common when a concussion is present and will improve with time. I asked him to call with any questions or concerns and will see him again in clinic in about 2   weeks. We discussed the risks and benefits of the medication including risk of worsening depression with medication adjustments and even the possibility of emergence of suicidality      Total time spent with the patient today was 65 minutes with greater than 50% of the time spent in counseling and care coordination.     Physical Exam: General appearance: alert, appears stated age, cooperative and no distress. No sensitivity to light.  Head: Normocephalic, without obvious abnormality, atraumatic  Neck: Full ROM with some pain  Neurologic: Mental status: Alert, oriented, thought content appropriate, affect: mood-congruent. Recent and remote memory grossly intact. Speech is clear and fluent with no obvious word finding or paraphasic errors. Pupils are equal and react to light direct and consensual accommodation. EOMs are intact without nystagmus. No exophoria/exotropia noted. Visual fields are grossly full. VOR grossly intact. Saccade and smooth pursuit grossly intact. Full facial movements, tongue protrudes midline soft palate rises symmetrically. Shoulder shrug is intact. Strength is 5/5, No pronator drift. Accurate finger to nose and sensation is intact to double simultaneous stimulation.  Reflexes are symmetrical, toes are down going. Romberg is negative.He is able to toe, heel, and tandem walk without difficulty.      Mental Status Examination  Patient is casually dressed and seated for evaluation. He is cooperative with questioning and eye contact is good. He is fully engaged in conversation today. He is alert and fully oriented. Speech is normal. Thought processes normal with normal prehension and expression. Thoughts are organized and linear. Content is pertinent to the conversation and without evidence of auditory or visual hallucinations. No delusional ideation. Affect/mood is euthymic-bright, even. Gen. fund of knowledge, insight and memory are normal

## 2021-06-08 NOTE — PROGRESS NOTES
How have you been doing since we last saw you? any concerns?( new pt. Ask how concussion happen?) no concerns,       Current Symptoms : Yes light sensitive

## 2021-06-08 NOTE — PROGRESS NOTES
"Initial Psychology Consultation (Standard)    Date of Service:  2017  Duration: 1 hour (10:00 AM - 11:00 AM)    Name:  Emily Chapin \"Kirk\"  :  1980  MRN:  [id number]    REASON FOR REFERRAL:  Mr. Chapin is a 36 y.o., single, , transgender male who is being seen for an evaluation of cognitive, emotional, and behavioral functioning at the request of Carmita Liz CNP. Collateral information was obtained from a brief review of the medical chart. Patient was informed of the role of psychology services in patient's care, the foreseeable risks and benefits, and the limits of confidentiality, and the patient agreed to proceed.    Family Involvement: Patient declined. Patient was seen individually without family present.    HISTORY OF PRESENTING PROBLEM:   According to the medical chart, on 10/8/16 patient was at [facility] (\"[facility]\") with his nephew when he tripped on a child in the dark. An employee tried to catch him, but accidentally knocked over a large metal barrel that then hit patient on the head. He reportedly experienced LOC for a few seconds and reported feeling nauseous and having a headache the next day. He was seen at the Emergency Room 4 days later on 10/13/16 and was diagnosed with a concussion. He reportedly had ongoing symptoms including headache and nausea. Patient was reportedly out of work for 3 weeks and when he tried to return, he was told the job was no longer available. Patient therefore had no insurance and was unable to follow-up with the [facility] at the recommendation of his PCP. Patient was seen for initial consult by Carmita Liz CNP on 17 after beginning a new job and holding new insurance. He reported ongoing symptoms including headaches that worsened with the beginning of his new job. He also reported sleeping more than usual, but having difficulty falling asleep. He reported being more irritable, dreaming more frequently, and experiencing " "anxiety and depressed mood. Patient was referred to psychology for evaluation and treatment of exacerbation of emotional symptoms following concussion.    Upon current interview, patient reported that he was doing relatively well when he began his new job at the end of November. He was in training for 5 weeks, during which time he did not report experiencing any postconcussive symptoms. 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 He noted that [employer] recently cut his hours to 6-hour shifts 4 days per week, which has been helping with postconcussive symptoms, but adding to financial stress/worry. Patient indicated that he has experienced emotional lability, stating that he can \"cry at the drop of a hat,\" while he did not premorbidly identify as an emotional person. He described his mood in general as \"eh,\" noting little energy and motivation to engage in usually pleasurable activities. He shared an example, noting that typically he gets together with friends 1-2 times per week, but that he has been less motivated to do this recently both because of fatigue as well as not being able to spend extra money. Patient stated that normally he keeps up with the housework, but has found it difficult to do this as of late. Patient also reported difficulty sleeping. He indicated that he has obstructive sleep apnea (YULIET), which has been well-controlled with the use of a CPAP. However, since the concussion, patient has been sleeping approximately 5 hours on average, being in bed from 11:00 PM to 7:00/7:30 AM, but having significant difficulty falling asleep. Patient reported a premorbid history of mild depression and anxiety, but noted that these were manageable. He had been seen in psychotherapy at approximately age 17 " "related to transgender issues, but did not continue this long-term.     Of note, patient reported that he had previously been receiving hormone replacement therapy (HRT), but due to insurance reasons has been off testosterone for approximately 6 months. He noted that a recent discussion with the speech therapist prompted him to gain curiosity regarding the impact of hormones on cognition and mood. Patient had previously been seen by the [facility] who recommended various endocrinologists. He indicated that he was evaluated by a psychologist prior to beginning HRT, but is concerned that he will need to restart this process before receiving HRT again.     Please see [facility] consult note dated 1/17/17 as well as neuropsychological evaluation report dated 1/18/17 for additional information regarding medical, psychosocial, and psychiatric history.    No non-personal contextual factors are reported.  No standardized assessment tools were administered; patient declined.    PSYCHIATRIC HISTORY:  Depressive Symptoms:  Patient reported current depressive symptoms including anhedonia, amotivation, emotional lability, fatigue, difficulty concentrating, and depressed mood. He reported a history of mild depressive symptoms related to life stressors (e.g., gender transition), but that this has been well-managed.    Manic Symptoms:  Patient denied a history of ari.    Anxiety Symptoms:  Patient reported current anxiety, particularly related to financial stressors. He noted a history of \"a constant sense of dread\" and constant worry, but noted that this has been under control for many years. He denied any formal diagnosis or treatment for anxiety.     Panic Symptoms:  Patient reported experiencing one panic attack in his life that occurred approximately 3 years ago. He noted that his brother took him to the Emergency Room, because he thought he was dying. He has not experienced any " "other panic attacks prior to or since that time.    PTSD Symptoms:  Patient denied a history of traumatic events or associated symptoms.     Obsessive Compulsive Symptoms:  Patient denied a history of obsessive rituals or compulsions.     Psychotic Symptoms:  Patient denied a history of psychosis including hallucinations or delusions. He reported that he occasionally hears unsubstantiated sounds as well as experiences \"[place] syndrome\" (i.e., feels as if he is shrinking or growing) when falling asleep, but attributed this to a possible inner ear problem. He noted that this only occurs approximately 3-4 times per year and does not cause clinically significant distress.    Cognitive Symptoms:  As noted above, patient reported current concerns related to attention/concentration. He denied a history of premorbid cognitive concerns.    Mental Health Treatment History:  Patient reported that his parents enrolled him in psychotherapy at the [facility] when he was approximately age 17. However, he noted that their impression was that this was for conversion therapy, but when they found out that this was not the case, they removed him from the program. Patient denied any additional history of mental health treatment including psychotherapy or medication management. Patient denied a history of inpatient psychiatric hospitalizations, suicide attempts, or suicidal ideation. Since the concussion, patient has been prescribed Wellbutrin and amitriptyline, although he indicated that he does not like taking amitriptyline due to feeling groggy and having difficulty waking up in the morning.    SUBSTANCE USE HISTORY:  Alcohol Abuse/Dependence:   Patient reported that he has never been a big drinker, but discontinued drinking completely after being diagnosed with diabetes in August 2016.    A CAGE Questionnaire was not administered secondary to absence of reported problematic " substance use.    Drug Abuse/Dependence:  Patient denied a history of illicit drug use or prescription medication misuse.    Tobacco Use:  Patient has been a smoker for approximately 10 years and smokes 1/2 ppd. He indicated that he is interested in quitting, but that it is currently helping him to manage stress.    Other Addictive Behavior(s):   None reported. Patient denied using caffeine.    Chemical Dependency Treatment History:  Patient denied a history of chemical dependency treatment or perceived need for treatment.    MEDICAL HISTORY:  Patient's medical history is notable for obstructive sleep apnea (well-managed with CPAP) and diabetes diagnosed August 2016. He indicated that he has been primarily managing this with diet. Patient is also s/p total hysterectomy and oophorectomy. He noted that he was previously receiving hormone replacement therapy (HRT), but has not received this for approximately 6 months due to insurance issues.      The patient does not report cultural factors impacting pursuit of care. The patient pursues standard Western medical care. Patient demonstrates adequate awareness and understanding of current medical and mental health conditions.     Current Medications Include:    Current Outpatient Prescriptions   Medication Sig Note     albuterol sulfate 90 mcg/actuation AePB Inhale 180 mcg every 4 (four) hours as needed.      amitriptyline (ELAVIL) 25 MG tablet Take 1 tablet (25 mg total) by mouth bedtime.      buPROPion (WELLBUTRIN XL) 150 MG 24 hr tablet Take 1 tablet (150 mg total) by mouth daily.      diclofenac sodium (VOLTAREN) 1 % Gel PJ 4 GRAMS TO KNEE QID PRN P. 1/17/2017: Received from: External Pharmacy     ibuprofen (ADVIL,MOTRIN) 200 MG tablet Take 200 mg by mouth every 6 (six) hours as needed for pain.      metoclopramide (REGLAN) 5 MG tablet Take 1 tablet (5 mg total) by mouth 3 (three) times a day as needed for nausea.      mometasone-formoterol (DULERA) 200-5  "mcg/actuation HFAA inhaler Inhale 2 puffs 2 (two) times a day.      ondansetron (ZOFRAN-ODT) 4 MG disintegrating tablet DIS ONE T PO  Q 8 H IF NEEDED NV 1/17/2017: Received from: External Pharmacy     RANITIDINE HCL (ZANTAC ORAL) Take by mouth as needed.      TESTOSTERONE CYPIONATE IM Inject into the shoulder, thigh, or buttocks. Every 2 weeks      VENTOLIN HFA 90 mcg/actuation inhaler INL 2 PFS PO Q 4 H PRN 1/17/2017: Received from: External Pharmacy       FAMILY MEDICAL/PSYCHIATRIC HISTORY:  Patient reported a history of heart disease on his father's side of the family. He indicated that he does not know his biological mother, therefore does not know any family history from her side. He denied any known family history of mental health or chemical health concerns.    SOCIAL HISTORY:   Family of Origin:   Patient reported that he was born and raised near [place], [place] with his father and stepmother. He has a stepbrother and a good friend who he calls his \"sister.\" Patient also has several half siblings, but indicated that he does not know them. Patient indicated that he is not close with his parents due to discord during his coming out and transgender process. He denied a history of physical, sexual and emotional abuse.    Educational/Developmental History:  Patient completed his GED and some college. He attended [employer], but did not complete a degree due to it becoming too expensive. He reported receiving average grades and denied a history of learning disabilities or ADHD. 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 However, he was fired from that position following the concussion. Patient has now been working at a new call center since the end of November.     Socioeconomic Status:   Patient reported general " "financial concerns due to recently reducing his hours at work.    Marital/Relationship History:    Patient has never been  and has no children.    Current Living Situation:    Patient currently lives alone with a cat in an apartment in [place], [place].    Social Support/Hobbies & Interests:    Patient reported enjoying board games, drawing, and reading.    Spiritual/Cultural Beliefs:    Patient reported that he is an Atheist and \"believes in science.\"    LEGAL HISTORY:  Patient reported that he was once arrested at age 22 or 23 due to having a warrant out for his arrest. He later found out that this was due to an unpaid $10 fine that he was unaware of due to letters being sent to a previous address. He indicated that this charge was dropped to a misdemeanor and he denied any additional history.    MENTAL STATUS EXAM:    Behavior toward examiner: cooperative, pleasant  Mood: depressed  Affect: flat, depressed, reported lability although not evidenced in session  Motor Activity: unremarkable  Suicidal Ideation: expressly denied  Homicidal Ideation: expressly denied  Thought process: logical, goal-directed  Thought content: within normal limits, no hallucinations or delusions noted  Fund of Knowledge: sufficient  Attention/Concentration: intact for purposes of current interview, reported some difficulty particularly at work  Language ability: intact  Speech: within normal limits  Memory: generally intact, although not formally assessed  Insight and Judgement: good  Orientation: appeared oriented to person, place, situation, and time, although orientation was not formally assessed  Appearance: casually dressed, adequately groomed  Eye Contact: appropriate  Estimated IQ: average    CLINICAL SUMMARY:    Patient is a 36 y.o., single, , transgender male who sustained a concussion on 10/8/16 after tripping and a barrel falling on his head. He initially noted feeling nauseous and having a headache. He was " referred to the [facility] by his PCP, although lost his job and insurance after being out of work for 3 weeks. Therefore, patient was unable to obtain medical care until he recently began a new job and obtained new insurance through [employer]. With regard to postconcussive symptoms, patient asserted that he was doing well after beginning his new job at the end of November 2016, as he spent 5 weeks in training. However, when he began full job duties, he experienced an exacerbation in symptoms including headache, difficulty concentrating, sensitivity to light and computer screens, and nausea associated with increased headache intensity. He has also experienced increased emotional lability, fatigue, depressed mood, anxiety, irritability, and difficulty sleeping. He reported a history of psychotherapy in his late teens related to transgender issues, but denied any additional premorbid history of psychiatric concerns or treatment. Additionally, patient reported that he was previously receiving hormone replacement therapy (HRT), but has not received this in approximately 6 months due to insurance issues. Patient was amenable to brief short-term psychotherapy to address goals of anxiety management, mood management, and identifying and implementing coping skills. Patient strengths include adequate awareness/understanding of medical and mental health concerns, help-seeking behavior, motivation toward change, and absence of significant premorbid psychiatric history.    DIAGNOSIS:   Adjustment Disorder with Mixed Anxiety and Depressed Mood    PLAN:     1. The patient will return in 2 weeks to continue cognitive-behavioral therapy to address depressed mood, anxiety, insomnia irritability, and adjustment to prolonged postconcussive symptoms.        2. Goals of treatment will include: mood management, anxiety management, identifying and implementing coping skills.    3.  The above goals were discussed with the  patient and agreement to proceed was obtained within his understanding level.  Part of the patient s care will be to address motivation as needed.      Thank you, Ms. Liz, for requesting the participation of psychology service in the care of this patient.      Provider Name:  Mckenzie Castaneda PsyD, LP  Date:  1/30/2017  Time:  10:00 AM

## 2021-06-08 NOTE — PROGRESS NOTES
"Speech Language/Pathology  Outpatient Speech Therapy   Evaluation and Initial Plan of Care    Emily Chapin  1980      [id number]   Referring provider: Carmita Liz CNP  Date of Onset 10/08/2016  Start of Care 01/30/2017     Medical Diagnosis mTBI  Treatment Diagnosis:cognition  Session 1 of 1  Interventions provided during this session: speech/language 55 minutes    SUBJECTIVE  Note: prefers name \"Wilmer\"  Pertinent history includes mTBI sustained last October when pt fell (tripped over a child when at \"[employer]") and was struck on the head by an empty barrel he had used to steady himself. Pt has suffered with poor sleep, pain, light and sound sensitivity since this injury. Other medical history includes YULIET total hysterectomy (pt is transgender); pt reports he had been taking male hormones until 6 months ago, but stopped due to financial/insurance limitations. Pt was functionally using CPAP for his YULIET; however, since injury, the noise keeps him awake. He estimates he sleeps 5 hours per night, describes being exhausted when he wakes.     Pt did not finish high school, but earned his GED later and attended 2 semesters of community college. 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 He was working a similar job at the time of his injury and was terminated; he started the current job in December. Pt lives alone with his cat in an apartment. He describes himself as having good social support, with his sister and grandparents living in the area and a group of friends, but reports he has not been spending time with them since his injury.     Patient presents as motivated and engaged during the session.  An  was not required  for this session.  Patient reports pain at 2 out of 10 headache (ranges daily from 1 to 7 - gets worse throughout the day)      OBJECTIVE  Speech: Oral motor function was not impaired. Motor speech was not " "impaired.  Speech intelligibility was 100% at the conversation level. Voice was not impaired.    Language: Pt c/o word-finding difficulty, including both hesitations and substituting other words. He was unable to identify how often this happens, and no instances of this deficit were noted at evaluation. Auditory comprehension was functional for the purposes of this evaluation.     Cognition: Neuropsychological testing completed by Dr. Annie Gamble on 01/18/2017 indicated that pt is experiencing an exacerbation of emotional symptoms, but no identifiable underlying cognitive deficits. Pt reports he \"doesn't like to leave (his) apartment\" and \"has let housework go.\" He indicated his rationale as being that he \"gets overwhelmed.\" He does not organize his medication in any specific way and reports hen started keeping a calendar at work - but doesn't have access to it when not at work. He is aware of making errors at work, but is leaving himself post-it note reminders for frequent items he had forgotten.     ASSESSMENT  Patient presents with no overt cognitive deficits. He was educated about general organization techniques (pill organization, calendar in phone) and was given both verbal and written education about word-finding strategies. He verbalized understanding of all education provided.     Prognosis for continued recovery is good once pain and depression are both addressed. I am also concerned about the potential impact of abruptly stopping his hormone therapy and how it might have affected his sleep and cognition. He does not currently have primary care. He indicated that he needs to find a provider in his new insurance network that is trans-gender friendly; together, we searched a directory that is available online of trans-friendly medical providers. He was encouraged to f/u.     PLAN  Evaluation only  Ongoing communication with concussion team    Physician Recommendation:  1. I certify the need for these " services furnished within this plan and while under my care. I agree with the therapist's recommendation for plan of care.  2. If there is any recommendation for modification of therapy plan, please indicate below.

## 2021-06-09 NOTE — PROGRESS NOTES
Psychology Discharge Note    Patient was seen for initial psychology consultation with this writer on 1/30/17 with no additional follow-up. He was contacted regarding his interest in rescheduling psychology follow-up with no response. Chart review also reveals that he no-showed recent other clinic appointments. Due to lack of response, it is assumed that patient is no longer interested in psychotherapeutic care. As such, he is considered discharged from psychology services at this time. He may re-establish care should he so desire, as appropriate, in the future.      Provider Name: Mckenzie Castaneda PsyD,   Date: 3/21/2017

## 2021-06-12 NOTE — PROGRESS NOTES
"Formerly [facility] Clinic Note    Emily Chapin   36 y.o. male    Date of Visit: 7/26/2017  Chief Complaint   Patient presents with     Asthma     Arthritis     Right Knee     Nicotine Dependence     Patient interested in quitting       ASSESSMENT/PLAN  1. Tobacco abuse     2. Depression     3. Primary osteoarthritis of both knees  Ambulatory referral to Orthopedics   4. Female-to-male transgender person     5. Uncomplicated asthma, unspecified asthma severity       ---------------------------------------------    1.  He is interested in quitting, had nightmares with nicotine patches.  Plan to do Chantix.  He understands that this could cause mood disturbance or vivid dreams.    2.  Of note, he had adverse effect from Wellbutrin, he is doing well of venlafaxine currently    3.  Surprisingly, has severe arthritis in both knees.  I did review an x-ray film he showed me on CD demonstrating moderately severe arthritis, particularly the medial compartment.  Recommended referral to [facility] for further options.  I did discuss options with him such as hyaluronic acid injections, steroid injection, alternate medication.      4.  He needs refill of his testosterone injections.  Recheck in 3 months for labs.  This would best be managed by endocrinology    5.  Asthma poorly controlled, restart controller Dulera, he will let me know if he is still using his inhaler more than 3-5 times per week.  Consider adding Singulair to supplement therapy.    Return in about 3 months (around 10/26/2017).      SUBJECTIVE  Emily Chapin \"Hank\" is a 36-year-old man with history of gender reassignment to male, here for med check.    First, he is hoping to get a refill of testosterone, takes 50 mg intramuscularly every 2 weeks.  He typically gets total testosterone and liver tests in follow-up.  He had a hysterectomy/oophorectomy several years ago.    He would like to quit smoking, currently doing one half pack per day, making his " asthma worse.  Unfortunately, he tried Wellbutrin and had suicidal ideation, admitted voluntarily for 72 hours.  This has been added to the allergy list.  He has tried patches, which worked okay, but caused nightmares.    He mentions that he has asthma, poorly controlled, waking up at night, having to use inhaler when he goes outside.  This was refilled about 10 days ago, and he has used 112 sprays of this thus far.  He wants a refill of Dulera, which seemed to work okay.  Singulair seemed to be less effective, but he has been on this before.    He is wondering what to do with his knees.  He jumped off a stool one time got an x-ray, and it showed severe arthritis in both knees.  He has been using ibuprofen and diclofenac gel with mild improvement.  He also used some therapeutic tape, which seemed to help as well.    ROS A comprehensive review of systems was performed and was otherwise negative    Medications, allergies, and problem list were reviewed and updated    Patient Active Problem List   Diagnosis     Postconcussion syndrome     Depression     Primary osteoarthritis of both knees     Female-to-male transgender person     Tobacco abuse     Asthma     No past medical history on file.  Past Surgical History:   Procedure Laterality Date     HYSTERECTOMY      with BSO     Social History     Social History     Marital status: Single     Spouse name: N/A     Number of children: N/A     Years of education: N/A     Occupational History     Not on file.     Social History Main Topics     Smoking status: Current Every Day Smoker     Types: Cigarettes     Smokeless tobacco: Not on file      Comment: 1/2 PACK PER DAY     Alcohol use Not on file     Drug use: Not on file     Sexual activity: Not on file     Other Topics Concern     Not on file     Social History Narrative     Family History   Problem Relation Age of Onset     Other Mother      unknown     Heart disease Father      Cancer Paternal Aunt      Cancer Paternal  Uncle      Heart disease Maternal Grandmother      Heart disease Maternal Grandfather        Current Outpatient Prescriptions   Medication Sig Dispense Refill     albuterol sulfate 90 mcg/actuation AePB Inhale 180 mcg every 4 (four) hours as needed. 180 each 3     diclofenac sodium (VOLTAREN) 1 % Gel PJ 4 GRAMS TO KNEE QID PRN P. 100 g 0     ibuprofen (ADVIL,MOTRIN) 200 MG tablet Take 200 mg by mouth every 6 (six) hours as needed for pain.       metoclopramide (REGLAN) 5 MG tablet Take 1 tablet (5 mg total) by mouth 3 (three) times a day as needed for nausea. 30 tablet 0     mometasone-formoterol (DULERA) 200-5 mcg/actuation HFAA inhaler Inhale 2 puffs 2 (two) times a day. 13 g 0     RANITIDINE HCL (ZANTAC ORAL) Take by mouth as needed.       testosterone cypionate (DEPOTESTOTERONE CYPIONATE) 200 mg/mL injection Inject 0.25 mL (50 mg total) into the shoulder, thigh, or buttocks every 14 (fourteen) days. Every 2 weeks 10 mL 2     venlafaxine (EFFEXOR-XR) 75 MG 24 hr capsule TAKE ONE CAPSULE BY MOUTH EVERY DAY 30 capsule 1     albuterol (PROVENTIL) 2.5 mg /3 mL (0.083 %) nebulizer solution Take 3 mL (2.5 mg total) by nebulization 3 (three) times a day. 60 mL 1     amitriptyline (ELAVIL) 10 MG tablet Take 1 tablet (10 mg total) by mouth bedtime. 30 tablet 3     varenicline (CHANTIX STARTING MONTH BOX) 0.5 mg (11)- 1 mg (42) tablet 1 wk before you stop smoking take 0.5mg daily on days 1-3, 0.5mg 2 times each day on days 4-7, then 1mg 2 times daily. 53 tablet 0     varenicline (CHANTIX) 1 mg tablet Take 1 tab by mouth two times a day. Take after eating with a full glass of water. NOTE: Dispense as maintenance for refills only. 60 tablet 2     No current facility-administered medications for this visit.        Allergies   Allergen Reactions     Morphine Nausea And Vomiting     Wellbutrin [Bupropion Hcl]      Suicidal ideal     Prednisone Anxiety       EXAM  Vitals:    07/26/17 1047   BP: 130/70   Pulse: 86   SpO2: 96%  "  Weight: (!) 253 lb 4.8 oz (114.9 kg)   Height: 5' 6\" (1.676 m)     General: Alert, pleasant, no distress  HEENT: Sclera anicteric, oral mucosa moist  Lungs: Nonlabored breathing, no gross wheezes  Endocrine: His facial hair around beard line and neck  Neurologic: No gross abnormalities  Psychiatric: Pleasant affect  Musculoskeletal: No effusion of knees  Skin: No rashes      RESULTS REVIEWED:     ANALYSIS AND SUMMARY OF OLD RECORDS, NOTES AND CONSULTS (2): Reviewed February 27, 2013 psychiatry note from [facility] regarding gender identity disorder    RADIOLOGY TESTS SUMMARIZED or REQUESTED (XRAY/CT/MRI/DXA) (1):     MEDICINE TESTS SUMMARIZED or REQUESTED (EKG/ECHO/COLONOSCOPY/EGD) (1): None    INDEPENDENT REVIEW OF EKG OR X-RAY (2): Personally reviewed x-ray film of the right knee, showing moderately severe medial compartment arthritis, also shows patellofemoral arthritis    Data points  4     Elvis Monk, DO  Internal Medicine  [facility]   "

## 2021-06-13 NOTE — PROGRESS NOTES
Results reviewed and no further action needed.    Jeffrey Smallwood, [facility]  Internal Medicine  [facility]  10/30/2017, 1:21 AM

## 2021-06-15 NOTE — PROGRESS NOTES
"[facility] Clinic Note    Emily Chapin   37 y.o. male    Date of Visit: 2/6/2018  Chief Complaint   Patient presents with     Migraine     X three days lasts all days long       ASSESSMENT/PLAN  1. Other migraine without status migrainosus, intractable     2. History of migraine headaches     3. Primary osteoarthritis of both knees     4. Asthma  albuterol (VENTOLIN HFA) 90 mcg/actuation inhaler   5. T2DM (type 2 diabetes mellitus)  Glycosylated Hemoglobin A1c     ---------------------------------------------    1-2.  Hank presents with intractable migraine headache over the last 3 days.  Imitrex worked for him in the past, so we will get a prescription for him.  He gets about one migraine a month nowadays.  Toradol and Zofran were given for him, he notes that the headache is starting to lessen.    3.  Unfortunately, steroid injection of the knees seem to make things worse.  He plans not to get steroid injection again.  This was at [facility].    4.  Refill inhaler    5.  We have last site of diabetes follow-ups.  He has history of diabetes, but has always been diet controlled.  I recommended A1c, since this has not been done recently.  He mentions checking his blood sugars to keep track of this, but A1c returned at over 9.  I have asked him to respond to my message regarding starting metformin and seeing him back in the next couple weeks so we can discuss interventions.  I think he will need metformin, possibly the addition of glipizide and probably diabetes educator.    Return in about 3 months (around 5/6/2018) for Recheck.      SUBJECTIVE  Emily Chapin \"Hank\" is a 37-year-old man who presents for ongoing migraine.  In his teens, he had a lot of migraines, but now he gets them more or less once a year.  He describes it as a pain on left side of the head, bothered by lights, loud sounds, gets nauseated if either of these occur.  He has taken acetaminophen, ibuprofen, even diclofenac, has " not made it go away.  This has lasted 3 days.      Medications, allergies, and problem list were reviewed and updated    Patient Active Problem List   Diagnosis     Postconcussion syndrome     Depression     Primary osteoarthritis of both knees     Female-to-male transgender person     Tobacco abuse     Asthma     T2DM (type 2 diabetes mellitus)     History of migraine headaches     No past medical history on file.  Current Outpatient Prescriptions   Medication Sig Dispense Refill     albuterol (PROVENTIL) 2.5 mg /3 mL (0.083 %) nebulizer solution TAKE 3ML(1 VIAL) BY MOUTH VIA NEBULIZER THREE TIMES DAILY 75 mL 0     albuterol (VENTOLIN HFA) 90 mcg/actuation inhaler INHALE 2 PUFFS BY MOUTH EVERY 4 HOURS AS NEEDED 18 g 1     albuterol (VENTOLIN HFA) 90 mcg/actuation inhaler USE 2 PUFFS BY MOUTH EVERY 4 HOURS AS NEEDED 18 g 0     fluticasone (FLONASE) 50 mcg/actuation nasal spray 1 spray into each nostril daily. 16 g 2     fluticasone-salmeterol 232-14 mcg/actuation AePB Inhale 1 puff 2 (two) times a day. 1 each 11     ibuprofen (ADVIL,MOTRIN) 200 MG tablet Take 200 mg by mouth every 6 (six) hours as needed for pain.       metoclopramide (REGLAN) 5 MG tablet Take 1 tablet (5 mg total) by mouth 3 (three) times a day as needed for nausea. 30 tablet 0     predniSONE (DELTASONE) 10 mg tablet TAKE 4 TABS BY MOUTH DAILY X 3 DAYS, THEN TAKE 2 TABS BY MOUTH DAILY X 2 DAYS, THEN STOP 16 tablet 0     RANITIDINE HCL (ZANTAC ORAL) Take by mouth as needed.       testosterone cypionate (DEPOTESTOTERONE CYPIONATE) 200 mg/mL injection Inject 0.25 mL (50 mg total) into the shoulder, thigh, or buttocks every 14 (fourteen) days. Every 2 weeks 10 mL 2     SUMAtriptan (IMITREX) 50 MG tablet Take 1 tablet (50 mg total) by mouth every 2 (two) hours as needed for migraine (max 200mg in 24hr period). 10 tablet 3     Current Facility-Administered Medications   Medication Dose Route Frequency Provider Last Rate Last Dose     ondansetron  disintegrating tablet 4 mg (ZOFRAN-ODT)  4 mg Oral Once Elvis Monk DO         Allergies   Allergen Reactions     Morphine Nausea And Vomiting     Wellbutrin [Bupropion Hcl]      Suicidal ideal     Prednisone Anxiety       EXAM  Vitals:    02/06/18 1454   BP: 106/76   Patient Site: Left Arm   Patient Position: Sitting   Cuff Size: Adult Regular   Pulse: 82   SpO2: 98%   Weight: (!) 255 lb (115.7 kg)         General: Alert, appears uncomfortable, lights Dim  Neurologic: No gross abnormalities, no dysarthria  Psychiatric: Appears uncomfortable, but otherwise pleasant affect    Lab Results   Component Value Date    HGBA1C 9.4 (H) 02/06/2018        Elvis Monk DO  Internal Medicine  [facility]

## 2021-06-16 NOTE — PROGRESS NOTES
[facility] Clinic Note    Emily Chapin   37 y.o. male    Date of Visit: 2/23/2018  Chief Complaint   Patient presents with     Follow-up     Diabetes       ASSESSMENT/PLAN  1. Type 2 diabetes mellitus without complication, without long-term current use of insulin  Microalbumin, Random Urine    Ambulatory referral to Diabetes Education (New Diagnosis)    Ambulatory referral to Bariatric Care: Surgical and Non-Surgical   2. BMI 40.0-44.9, adult  Ambulatory referral to Bariatric Care: Surgical and Non-Surgical   3. Asthma  albuterol (VENTOLIN HFA) 90 mcg/actuation inhaler    DISCONTINUED: albuterol (VENTOLIN HFA) 90 mcg/actuation inhaler     ---------------------------------------------    1.  Poorly controlled diabetes detected on A1c of 9.4.  I was initially under the impression that he had diabetes along time ago, has been well controlled ever since then, but he really did not have proper follow-up.  Requested records from [facility] where he was originally diagnosed.  I think we need to start at square one with diabetes educator referral, starting aspirin, atorvastatin, metformin (500 mg for a few days, then increase to 1000 mg, extended release form).  He has irritable bowel syndrome with diarrhea, I cautioned him to watch for any changes with his bowel habit.  --Atorvastatin, aspirin, metformin as above  --Microalbumin  --One-month follow-up with me  --Earlier follow-up with diabetes educator    2.  Referred to bariatric clinic, see HPI obesity is central to his problems involving diabetes, knee pain, etc.    3.  One-year refill for albuterol    Return in about 1 month (around 3/23/2018) for Recheck.      SUBJECTIVE  Emily Chapin is a 37-year-old man who presents for follow-up.    It sounds like sometime ago, he was diagnosed with diabetes with a blood test, saw diabetes educator once, got a glucometer and supplies, then never followed up again because of loss of insurance.  He was somewhat vague in  his reply as far as what his blood sugars were at the time.  His meter does not seem to work anymore.    At the last visit, we recheck A1c just for surveillance, not knowing that he really did not get proper diabetes care/follow-up previously, and A1c returned at 9.4.  He states that his weight has remained stable over the past few years, he eats about once a day, usually a bologna sandwich and drinks a full bottle of Coca-Cola, which he has since cut out.  He does not eat in the morning because he feels nauseated, usually does not eat in the evening because he is too tired.  He does not snack during the day.  In review of the chart, weight was 241 pounds October 2016, now is 253 pounds.    I reviewed with him what diabetes is, primarily insulin resistance, and what end organ damage can happen as result.  He was counseled on proper screenings, strategy for diabetes control with exercise, diet, medications, aiming for A1c 7.0 or lower.  He is also counseled on the use of aspirin and atorvastatin to lower his risk of heart attack in the long run as well as peripheral arterial disease.    We also discussed his weight, BMI is 41, he had some friends that use to the [employer] diet program, but the food is too expensive.  He is interested in bariatric clinic for medical weight loss, but when I broach the idea of surgery, he was not yet ready to entertain this idea.    1 of the obstacles to his care is the fact that he cannot exercise, since he has a sedentary job, sits in the chair for 10 hours, has osteoarthritis of the knees and has too much pain to do much in terms of exercise.    ROS A comprehensive review of systems was performed and was otherwise negative    Medications, allergies, and problem list were reviewed and updated    Patient Active Problem List   Diagnosis     Postconcussion syndrome     Depression     Primary osteoarthritis of both knees     Female-to-male transgender person     Tobacco abuse      Asthma     T2DM (type 2 diabetes mellitus)     History of migraine headaches     No past medical history on file.  Past Surgical History:   Procedure Laterality Date     HYSTERECTOMY      with BSO     Social History     Social History     Marital status: Single     Spouse name: N/A     Number of children: N/A     Years of education: N/A     Occupational History     Not on file.     Social History Main Topics     Smoking status: Current Every Day Smoker     Types: Cigarettes     Smokeless tobacco: Never Used      Comment: 1/2 PACK PER DAY     Alcohol use Not on file     Drug use: Not on file     Sexual activity: Not on file     Other Topics Concern     Not on file     Social History Narrative     Family History   Problem Relation Age of Onset     Other Mother      unknown     Heart disease Father      Cancer Paternal Aunt      Cancer Paternal Uncle      Heart disease Maternal Grandmother      Heart disease Maternal Grandfather        Current Outpatient Prescriptions   Medication Sig Dispense Refill     albuterol (PROVENTIL) 2.5 mg /3 mL (0.083 %) nebulizer solution TAKE 3ML(1 VIAL) BY MOUTH VIA NEBULIZER THREE TIMES DAILY 75 mL 0     albuterol (VENTOLIN HFA) 90 mcg/actuation inhaler INHALE 2 PUFFS BY MOUTH EVERY 4 HOURS AS NEEDED 18 g 11     fluticasone (FLONASE) 50 mcg/actuation nasal spray 1 spray into each nostril daily. 16 g 2     fluticasone-salmeterol 232-14 mcg/actuation AePB Inhale 1 puff 2 (two) times a day. 1 each 11     ibuprofen (ADVIL,MOTRIN) 200 MG tablet Take 200 mg by mouth every 6 (six) hours as needed for pain.       metoclopramide (REGLAN) 5 MG tablet Take 1 tablet (5 mg total) by mouth 3 (three) times a day as needed for nausea. 30 tablet 0     RANITIDINE HCL (ZANTAC ORAL) Take by mouth as needed.       SUMAtriptan (IMITREX) 50 MG tablet Take 1 tablet (50 mg total) by mouth every 2 (two) hours as needed for migraine (max 200mg in 24hr period). 10 tablet 3     testosterone cypionate  (DEPOTESTOTERONE CYPIONATE) 200 mg/mL injection Inject 0.25 mL (50 mg total) into the shoulder, thigh, or buttocks every 14 (fourteen) days. Every 2 weeks 10 mL 2     aspirin 81 mg chewable tablet Chew 1 tablet (81 mg total) daily. 30 tablet 11     atorvastatin (LIPITOR) 10 MG tablet Take 1 tablet (10 mg total) by mouth daily. 30 tablet 11     metFORMIN (GLUCOPHAGE XR) 500 MG 24 hr tablet Take 2 tablets (1,000 mg total) by mouth daily with supper. 60 tablet 11     Current Facility-Administered Medications   Medication Dose Route Frequency Provider Last Rate Last Dose     ondansetron disintegrating tablet 4 mg (ZOFRAN-ODT)  4 mg Oral Once Elvis Monk DO           Allergies   Allergen Reactions     Morphine Nausea And Vomiting     Wellbutrin [Bupropion Hcl]      Suicidal ideal     Prednisone Anxiety       EXAM  Vitals:    02/23/18 1121   BP: 110/78   Patient Site: Left Arm   Patient Position: Sitting   Cuff Size: Adult Large   Pulse: 79   SpO2: 97%   Weight: (!) 253 lb 3 oz (114.8 kg)         General: alert, no distress  Neuro: no gross abnormalities  Psychiatric: Pleasant affect    This visit lasted a total of 28 minutes.  Over 50% of the time was spent counseling regarding he management of diabetes.     Elvis Monk DO  Internal Medicine  [facility]

## 2021-06-16 NOTE — PROGRESS NOTES
Assessment: Wilmer is here for education on diabetes management.  He is taking 500mg of metformin XR.  His meter stopped working a couple months ago.  We found that the batteries were dead so he will replace them so he can use up all the test strips he has.  He was also given a new accu chek guide meter today for future strips.  Today we went over diabetes pathophysiology, metformin/LAINEZ/SGLT-2 physiology and side effects, A1C & bg goals, preventing future complications, hypo/hyperglycemia, portion sizing, and when to test in a day.  I suggested checking at least 2x/daily before breakfast and at bedtime.  He has a high-deductible insurance and has trouble affording any and all medications currently.  He has been out of work for the week and states it is due to the side effects of metformin.  I encouraged him to stop taking metformin for now and we will find medications he can afford that will control his blood sugars.  We discussed co-pay cards that may reduce the price on SGLT-2's/DPP-4s & Tresiba insulin.  He was shown how to use an insulin pen today if he needs to start taking it within the next 2 weeks.  These Rx's will be sent to his pharmacy to check how much he will need to pay out of pocket.  Depending on the price we will determine a proper regimen for him.  If none of these are affordable, I would suggest starting on glipizide XL 5-10mg and titrating up until his morning blood sugars are .  He has not been hungry since taking the metformin and unfortunately has not lost weight.  All he had to eat today was 2 tablespoons of yogurt and had a blood sugar of 187 2 hours afterwards.        Plan: Wilmer will check his blood sugars 2x/daily before breakfast and at bedtime.  He will go to his pharmacy to find out how much each of these medications will cost him with the co-pay cards he was given.  Based on the affordability, we will select a medication regimen to help control his blood sugars between Januvia,  Jardiance, Tresiba, & Glipizide XL.  He will discontinue the metformin XR 500mg because it is keeping him out of work.  We are scheduled to follow up in 2 weeks to review his blood sugars.  Update:  Wilmer called to inform me that jardiance and Januvia were >$250, but Tresiba was $30 with the co-pay card.  He has other expensive medications so the Tresiba may help him pay off his deductible and be the best option therefore.  An Rx was sent in for insulin pen needles and suggested he start with 16 units daily.  He will titrate every 3-4 days by 2 units until his morning blood sugars are .  If he does start on the Tresiba insulin, he will likely not need the Glipizide XL.    Subjective and Objective:      Emily Chapin is referred by Dr. Elvis Monk for Diabetes Education.     Lab Results   Component Value Date    HGBA1C 9.4 (H) 02/06/2018         Current diabetes medications:  Metformin xr 500mg    Goals       a1c <7.0       Monitoring            Kirk will check his blood sugars 1-2x/daily.            Follow up:   Primary care visit  CDE (certified diabetic educator)      Education:     Monitoring   Meter (per above goals): Assessed and Discussed  Monitoring: Assessed, Discussed and Literature provided  BG goals: Assessed, Discussed and Literature provided    Nutrition Management  Nutrition Management: Assessed, Discussed and Literature provided  Weight: Assessed, Discussed and Literature provided  Portions/Balance: Assessed and Discussed  Carb ID/Count: Not addressed  Label Reading: Not addressed  Heart Healthy Fats: Not addressed  Menu Planning: Not addressed  Dining Out: Not addressed  Physical Activity: Assessed and Discussed  Medications: Assessed, Discussed and Literature provided  Orals: Assessed, Discussed and Literature provided  Injected Medications: Assessed, Discussed and Literature provided   Storage/Exp:Assessed, Discussed and Literature provided   Site Rotation: Assessed, Discussed and  Literature provided   Sites Assessed: yes    Diabetes Disease Process: Assessed, Discussed and Literature provided    Acute Complications: Prevent, Detect, Treat:  Hypoglycemia: Assessed and Discussed  Hyperglycemia: Assessed and Discussed  Sick Days: Not addressed  Driving: Not addressed    Chronic Complications  Foot Care:Assessed and Discussed  Skin Care: Not addressed  Eye: Assessed and Discussed  ABC: Assessed and Discussed  Teeth:Not addressed  Goal Setting and Problem Solving: Assessed and Discussed  Barriers: Assessed and Discussed  Psychosocial Adjustments: Assessed and Discussed      Time spent with the patient: 60 minutes for diabetes education and counseling.   Previous Education: no  Visit Type:ADE [place]  3/2/2018

## 2021-06-19 NOTE — PROGRESS NOTES
Assessment:    Recent episodes of flushing and diarrhea concerning for anaphylaxis.  No immediate trigger.  Consider alpha gal allergy.  Consider mast cell disorder.    Also consider thermoregulatory flushing.  Medicine related flushing-testosterone. Carcinoid.    Plan:    We will obtain serum tryptase, TSH, IgE for alpha-gal, 24-hour urine for 5-HIAA, and methyl histamine and 11 beta prostaglandin.    Note for emergency room staff to order serum tryptase if having another episode.    EpiPen prescription.  Demonstrated proper use  ____________________________________________________________________________     Patient comes in today for evaluation of possible allergic reactions.  This first started on July 11.  On that day he woke up with red burning hands.  That sensation spread diffusely.  He felt short of breath.  He felt his heart racing.  He felt dizzy.  He did try an albuterol inhaler at home, but it did not help.  He activated emergency see medical system he called an ambulance was sent.  Albuterol nebulizer was done in route to the emergency room.  In the emergency room there is no noted wheezing.  Had a blood pressure of 93/55.  In the ambulance pulse oximetry was 89%.  Elevated d-dimer but chest CT was negative for pulmonary embolism.  Patient was observed overnight.  He reports feeling much better within an hour of onset.  He had no food or medicine before the onset of symptoms.  He recalls eating [employer] hamburger the evening before.  He did report having some diarrhea as well.  Since then he had another episode on July 24.  At that time had burning and sensation of flushing.  Also some diarrhea within 20 minutes of onset.  He recalls eating at [employer] cheeseburger and French fries 20 minutes before onset.  Symptoms were less severe this time.  He did not go to the emergency room.  They resolved within an hour.  He does have a history of allergic rhinitis with primarily fall allergies.  He does  not report any active symptoms of allergic rhinitis with these episodes.  Since then he has had some sensation of flushing.  Often it is one-sided.  No description of urticaria or angioedema.  No clear association with taking medicine.  He does take ibuprofen regularly.  Patient is transgender male and was previously on testosterone but had stopped it 3 months before onset.    Review of symptoms:  As above, otherwise negative    Past medical history: Type 2 diabetes, ichthyosis, depression, female to male transgender, obesity, migraine headaches    Allergies: No known allergies to medications, latex, foods or hymenoptera venom    Family history: No known member of the family with allergy or asthma.    Social history: Currently has been in the same apartment since March.  He has been within the same apartment complex for 4 years.  No visible mold in the home.  He has had a cat for 5 years.  He does smoke cigarettes.    Medications: reviewed in chart    Physical Exam:  General:  Alert and in no apparent distress.  Eyes:  Sclera clear.  Ears: TMs translucent grey with bony landmarks visible. Nose: Pale, boggy mucosal membranes.  Throat: Pink, moist.  No lesions.  Neck: Supple.  No lymphadenopathy.  Lungs: CTA.  CV: Regular rate and rhythm. Extremities: Well perfused.  No clubbing or cyanosis. Skin: No rash    45 min spent in direct contact with the patient.  More than 50% in counseling and coordination of care.

## 2021-06-19 NOTE — PROGRESS NOTES
"[facility] Clinic Note    Emily Chapin   37 y.o. male    Date of Visit: 7/25/2018  Chief Complaint   Patient presents with     Follow-up     Went ER two times, low BP and chest pain       ASSESSMENT/PLAN  1. Allergic reaction, subsequent encounter  Ambulatory referral to Allergy   2. Asthma     3. Female-to-male transgender person     4. Type 2 diabetes mellitus without complication, with long-term current use of insulin  Glycosylated Hemoglobin A1c   5. Morbid obesity (H)       ---------------------------------------------    1. Potential allergic reaction reminiscent of anaphylaxis, though would need more clarification if this is the case and what may have caused it.  Referred to allergy    2. Noted, ACT score poor, not reassessed today    3. He would be interested in changing from injections to gel preparations.  Will d/w [facility] pharmacy.     4. A1c recheck 7.2, doing well with Tresiba, may be interested in other options (will need to clarify at next visit).  He may be stuck with insulin for now    5. Weight loss would certainly improve #4, f/u next visit.      Return in about 1 month (around 8/25/2018) for Recheck.      SUBJECTIVE  Emily Chapin \"Kirk\" is a 37-year-old man with history of testosterone administration, diabetes, asthma, who presents for ER follow-up.    He had been to the ER several times in the recent past including an episode on July 11 for shortness of breath, for which he was admitted and administered steroids.  He describes some burning sensation on his palms, spreading red rash of his arms and chest and neck followed by shortness of breath which lasted most of the day.  Oxygenation was measured at 89% on room air at that episode, he was admitted observation and discharged the following day, symptoms had pretty much resolved.  He was discharged on a 4 day course of prednisone, ended up coming into the hospital on July 18 for symptoms of polyuria, polydipsia, and hyperglycemia.  This " was a short-lived episode.    Then, on July 24 which was yesterday, he had recently ate at [employer] in the afternoon and developed the burning and itching in his palms again.  He got red in the upper torso again and called for help right away.  He went into the emergency room.  They thought that perhaps he had an allergic reaction.  He did tell me that he had throat tightness as well.  There was some mention of low blood pressure at the previous hospital visit, but this was not well documented.  He has history of low blood pressure.  He felt that his lips were burning at the time, though no visible lip swelling.  This time, the episode was short-lived, only lasted an hour or 2, then resolved.  The rash usually is fairly short-lived as well.    He tells me he is very sensitive to the heat and gets hot easily, though this was not necessarily the case around the time that he had these episodes.  Is not exercising during the episode episodes.    In regards to diabetes, his average has been in about 114, he is on Tresiba, metformin, and glipizide.  He worked with Buck the diabetes educator.  Is interested in perhaps changing to a different form of insulin then to see above versus a GLP-1 agonist.  He has a different insurance which might cover this now.    ROS A comprehensive review of systems was performed and was otherwise negative     All medications before and after hospitalization including the changes were reconciled and reviewed with the patient.        Patient Active Problem List   Diagnosis     Depression     Primary osteoarthritis of both knees     Female-to-male transgender person     Tobacco abuse     Asthma     T2DM (type 2 diabetes mellitus) (H)     History of migraine headaches     Allergic reaction, subsequent encounter     Morbid obesity (H)     Past Medical History:   Diagnosis Date     Postconcussion syndrome 10/20/2016     Past Surgical History:   Procedure Laterality Date     HYSTERECTOMY       "with BSO     Social History     Social History     Marital status: Single     Spouse name: N/A     Number of children: N/A     Years of education: N/A     Occupational History     Not on file.     Social History Main Topics     Smoking status: Current Every Day Smoker     Types: Cigarettes     Smokeless tobacco: Never Used      Comment: 1/2 PACK PER DAY     Alcohol use Not on file     Drug use: Not on file     Sexual activity: Not on file     Other Topics Concern     Not on file     Social History Narrative     Family History   Problem Relation Age of Onset     Other Mother      unknown     Heart disease Father      Cancer Paternal Aunt      Cancer Paternal Uncle      Heart disease Maternal Grandmother      Heart disease Maternal Grandfather        Current Outpatient Prescriptions   Medication Sig Dispense Refill     albuterol (PROVENTIL) 2.5 mg /3 mL (0.083 %) nebulizer solution Take 3 mL (2.5 mg total) by nebulization 3 (three) times a day. 75 mL 0     fluticasone-salmeterol 232-14 mcg/actuation AePB Inhale 1 puff 2 (two) times a day. 1 each 11     ibuprofen (ADVIL,MOTRIN) 200 MG tablet Take 200 mg by mouth every 6 (six) hours as needed for pain.       insulin degludec (TRESIBA FLEXTOUCH U-100) 100 unit/mL (3 mL) InPn Inject 16-20 Units under the skin daily. 15 mL 1     metoclopramide (REGLAN) 5 MG tablet Take 1 tablet (5 mg total) by mouth 3 (three) times a day as needed for nausea. 30 tablet 0     pen needle, diabetic (BD ULTRA-FINE COREY PEN NEEDLES) 32 gauge x 5/32\" Ndle Use 1 per day. 100 each 4     VENTOLIN HFA 90 mcg/actuation inhaler INHALE 2 PUFFS BY MOUTH EVERY 4 HOURS AS NEEDED 18 g 0     aspirin 81 mg chewable tablet Chew 1 tablet (81 mg total) daily. 30 tablet 11     atorvastatin (LIPITOR) 10 MG tablet Take 1 tablet (10 mg total) by mouth daily. 30 tablet 11     DULERA 200-5 mcg/actuation HFAA inhaler INHALE 2 PUFFS BID  11     fluticasone (FLONASE) 50 mcg/actuation nasal spray 1 spray into each " nostril daily. 16 g 2     glipiZIDE (GLUCOTROL XL) 5 MG 24 hr tablet Take 1 tablet (5 mg total) by mouth daily with breakfast. 30 tablet 3     metFORMIN (GLUCOPHAGE XR) 500 MG 24 hr tablet Take 2 tablets (1,000 mg total) by mouth daily with supper. 60 tablet 11     RANITIDINE HCL (ZANTAC ORAL) Take by mouth as needed.       SUMAtriptan (IMITREX) 50 MG tablet Take 1 tablet (50 mg total) by mouth every 2 (two) hours as needed for migraine (max 200mg in 24hr period). 10 tablet 3     testosterone cypionate (DEPOTESTOTERONE CYPIONATE) 200 mg/mL injection INJECT 0.25ML INTO THE SHOULDER, THIGH, BUTTOCKS EVERY 14 DAYS 10 mL 0     venlafaxine (EFFEXOR XR) 75 MG 24 hr capsule Take 1 capsule (75 mg total) by mouth daily. 30 capsule 2     Current Facility-Administered Medications   Medication Dose Route Frequency Provider Last Rate Last Dose     ondansetron disintegrating tablet 4 mg (ZOFRAN-ODT)  4 mg Oral Once Elvis Monk,            Allergies   Allergen Reactions     Morphine Nausea And Vomiting     Wellbutrin [Bupropion Hcl]      Suicidal ideal     Prednisone Anxiety       EXAM  Vitals:    07/25/18 1205   BP: 102/68   Patient Site: Left Arm   Patient Position: Sitting   Cuff Size: Adult Large   Pulse: 92   SpO2: 98%   Weight: (!) 244 lb (110.7 kg)         General: alert, no distress  HEENT: sclerae anicteric, moist oral mucosa, no lip or tongue swelling  Heart: Regular rate and rhythm, no murmurs.  No pretibial edema.  Warm extremities  Lungs: Clear to auscultation bilat  Gastrointestinal: abdomen is soft, non-tender, non-distended.    Skin: warm/dry, no residual rash  Neuro: no gross abnormalities  Psych: pleasant affect        RESULTS REVIEWED:     ANALYSIS AND SUMMARY OF OLD RECORDS, NOTES AND CONSULTS (2): Reviewed ER notes, hospital discharge summary, summarized above    RECORDS REQUESTED (1): Care everywhere accessed.     OTHER HISTORY SUMMARIZED (from nursing staff, family, friends) (2):     RADIOLOGY  TESTS SUMMARIZED or REQUESTED (XRAY/CT/MRI/DXA) (1): CTA of the chest previously performed which is negative    MEDICINE TESTS SUMMARIZED or REQUESTED (EKG/ECHO/COLONOSCOPY/EGD) (1): None    INDEPENDENT REVIEW OF EKG OR X-RAY (2): None.    Recent Results (from the past 240 hour(s))   Glycosylated Hemoglobin A1c   Result Value Ref Range    Hemoglobin A1c 7.2 (H) 3.5 - 6.0 %        Data points  5     Elvis Monk DO  Internal Medicine  [facility]

## 2021-06-25 NOTE — PROGRESS NOTES
Progress Notes by Jeffrey Smallwood at 10/25/2017 11:40 AM     Author: Jeffrey Smallwood Service: -- Author Type: Nurse Practitioner    Filed: 11/3/2017  8:16 AM Encounter Date: 10/25/2017 Status: Signed    : Jeffrey Smallwood [facility]    Date of Service: 10/25/2017  Primary Provider: Elvis Monk DO    Patient Care Team:  Elvis Monk DO as PCP - General (Internal Medicine)     ______________________________________________________________________     Patient's Pharmacy:    [facility] [address] [place] [address] [address] [place] [address] [place] [address]  Phone: [phone] Fax: [phone]     Patient's Insurance:    Payor: BLUE CROSS / Plan: [place] / Product Type: PMAP /     ______________________________________________________________________     Assessment:    1. Nausea    2. Asthma    3. Cough    4. Postnasal drip    5. Sore throat and laryngitis       ______________________________________________________________________    PHQ-2 Total Score: 0 (10/25/2017 12:24 PM)  PHQ-9 Total Score: 0 (10/25/2017 12:24 PM)       Plan:  Patient Instructions   1. Labs ordered today:  - Rapid Strep A Screen-Throat    2. Medications prescribed/refilled at this visit:  - metoclopramide (REGLAN) 5 MG tablet; Take 1 tablet (5 mg total) by mouth 3 (three) times a day as needed for nausea.  Dispense: 30 tablet; Refill: 0  - benzonatate (TESSALON) 200 MG capsule; Take 1 capsule (200 mg total) by mouth 3 (three) times a day as needed for cough.  Dispense: 30 capsule; Refill: 1  - dextromethorphan-guaiFENesin  mg/5 mL Liqd; Take 5 mL by mouth 3 (three) times a day as needed (Cough).  Dispense: 120 mL; Refill: 1  - fluticasone (FLONASE) 50 mcg/actuation nasal spray; 1 spray into each nostril daily.  Dispense: 16 g; Refill: 2  - albuterol (VENTOLIN HFA) 90 mcg/actuation inhaler; INHALE 2 PUFFS  BY MOUTH EVERY 4 HOURS AS NEEDED  Dispense: 18 g; Refill: 0    3. Work Excuse for employer given to patient    _____________________________________________________________________   Emily Chapin is 37 y.o. male who comes in today for:  Chief Complaint   Patient presents with   ? Cough     x 2 weeks, chest congestion, sleeping is hard, not coughing up mucus, no fever. Just had a z-misa had a sinus infection then this came about      ______________________________________________________________________     Patient Active Problem List   Diagnosis   ? Postconcussion syndrome   ? Depression   ? Primary osteoarthritis of both knees   ? Female-to-male transgender person   ? Tobacco abuse   ? Asthma   No past medical history on file.     Past Surgical History:   Procedure Laterality Date   ? HYSTERECTOMY      with BSO       Current Outpatient Prescriptions   Medication Sig   ? albuterol (PROVENTIL) 2.5 mg /3 mL (0.083 %) nebulizer solution Take 3 mL (2.5 mg total) by nebulization 3 (three) times a day.   ? albuterol (VENTOLIN HFA) 90 mcg/actuation inhaler INHALE 2 PUFFS BY MOUTH EVERY 4 HOURS AS NEEDED   ? budesonide-formoterol (SYMBICORT) 160-4.5 mcg/actuation inhaler Inhale 2 puffs 2 (two) times a day.   ? diclofenac (VOLTAREN) 50 MG EC tablet Take 1 tablet (50 mg total) by mouth 2 (two) times a day as needed.   ? ibuprofen (ADVIL,MOTRIN) 200 MG tablet Take 200 mg by mouth every 6 (six) hours as needed for pain.   ? metoclopramide (REGLAN) 5 MG tablet Take 1 tablet (5 mg total) by mouth 3 (three) times a day as needed for nausea.   ? RANITIDINE HCL (ZANTAC ORAL) Take by mouth as needed.   ? testosterone cypionate (DEPOTESTOTERONE CYPIONATE) 200 mg/mL injection Inject 0.25 mL (50 mg total) into the shoulder, thigh, or buttocks every 14 (fourteen) days. Every 2 weeks   ? benzonatate (TESSALON) 200 MG capsule Take 1 capsule (200 mg total) by mouth 3 (three) times a day as needed for cough.   ? dextromethorphan-guaiFENesin  " mg/5 mL Liqd Take 5 mL by mouth 3 (three) times a day as needed (Cough).   ? fluticasone (FLONASE) 50 mcg/actuation nasal spray 1 spray into each nostril daily.      Social History     Social History   ? Marital status: Single     Spouse name: N/A   ? Number of children: N/A   ? Years of education: N/A     Occupational History   ? Not on file.     Social History Main Topics   ? Smoking status: Current Every Day Smoker     Types: Cigarettes   ? Smokeless tobacco: Not on file      Comment: 1/2 PACK PER DAY   ? Alcohol use Not on file   ? Drug use: Not on file   ? Sexual activity: Not on file     Other Topics Concern   ? Not on file     Social History Narrative     ______________________________________________________________________     History of present illness: Emily Chapin is a pleasant 37 y.o. male with a PMH pertinent for asthma, tobacco abuse, OA knees, postconcussion syndrome, and depression who presents in clinic today for evaluation of cough. Cough x 2 weeks that came with the recent sinusitis symptoms.  He was treated with azithromycin and seemed to be of benefit, but his cough has increased a bit since then.  It is a nonproductive cough and has \"thick mucus\" in the back of his throat, white colored.  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 Also has some pain under the right shoulder blade which is worse with a deep inspiration over the last 4-5 days.      Review of systems:   On ROS, the patient denies fevers, chills, sweats, shortness of breath, wheezing, abdominal pain.  Positive for intermittent nausea r/t hx concussion and symptoms as noted in the HPI.  ______________________________________________________________________     /80 (Patient Site: Right Arm, Patient Position: Sitting, Cuff Size: Adult Regular)  Pulse 78  Temp 98.4  F (36.9  C) (Oral)   Wt (!) 261 lb 1.6 oz (118.4 kg)  SpO2 98%  BMI 42.14 kg/m2    Exam:  Patient is comfortable, no " apparent distress.  Mood good.  Insight good.  Ears - TM's and canals are normal .  Nose exam shows mild rhinitis.  Throat - mild-moderate erythema, no exudate, or thrush.  Neck is supple, there is no cervical adenopathy.  No thyromegaly.  Heart regular rate and rhythm.  Lungs are clear to auscultation bilaterally.  Respiratory effort is good. Frequent dry cough throughout visit.      Jeffrey Smallwood CNP  Internal Medicine  [facility]

## 2021-06-25 NOTE — PROGRESS NOTES
Progress Notes by Jeffrey Smallwood at 11/3/2017 12:40 PM     Author: Jeffrey Smallwood Service: -- Author Type: Nurse Practitioner    Filed: 11/3/2017  2:13 PM Encounter Date: 11/3/2017 Status: Signed    : Jeffrey Smallwood [facility]    Date of Service: 11/3/2017  Primary Provider: Elvis Monk DO    Patient Care Team:  Elvis Monk DO as PCP - General (Internal Medicine)     ______________________________________________________________________     Patient's Pharmacy:    [facility] [address] [place] [address] [address] AT [place] [address] [place] [address]  Phone: [phone] Fax: [phone]     Patient's Insurance:    Payor: BLUE CROSS / Plan: [place] / Product Type: PMAP /     ______________________________________________________________________     Assessment:    1. URI with cough and congestion    2. Asthma exacerbation       ______________________________________________________________________    PHQ-2 Total Score: 0 (10/25/2017 12:24 PM)  PHQ-9 Total Score: 0 (10/25/2017 12:24 PM)       Plan:  Patient Instructions   1. Medication prescribed today:  - amoxicillin (AMOXIL) 500 MG capsule; Take 1 capsule (500 mg total) by mouth 3 (three) times a day for 10 days.  Dispense: 30 capsule; Refill: 0    2. Start the prednisone burst today as directed/discussed    3. Continue with albuterol nebulizer every 4 hours as needed    4. Chest x-ray today was negative for pneumonia or acute findings    5. Cough syrup and/or benzonatate as needed for cough.       ______________________________________________________________________     Emily Chapin is 37 y.o. male who comes in today for:  Chief Complaint   Patient presents with   ? Shortness of Breath     SOB for 3 weeks. Chest tightness. Had a cough that has mostly cleared. Had sore throat but has mostly gotten better. No body ache or fever.        ______________________________________________________________________     Patient Active Problem List   Diagnosis   ? Postconcussion syndrome   ? Depression   ? Primary osteoarthritis of both knees   ? Female-to-male transgender person   ? Tobacco abuse   ? Asthma   ? T2DM (type 2 diabetes mellitus)   No past medical history on file.     Past Surgical History:   Procedure Laterality Date   ? HYSTERECTOMY      with BSO      Allergies   Allergen Reactions   ? Morphine Nausea And Vomiting   ? Wellbutrin [Bupropion Hcl]      Suicidal ideal   ? Prednisone Anxiety      Current Outpatient Prescriptions   Medication Sig   ? albuterol (PROVENTIL) 2.5 mg /3 mL (0.083 %) nebulizer solution Take 3 mL (2.5 mg total) by nebulization 3 (three) times a day.   ? albuterol (VENTOLIN HFA) 90 mcg/actuation inhaler INHALE 2 PUFFS BY MOUTH EVERY 4 HOURS AS NEEDED   ? benzonatate (TESSALON) 200 MG capsule Take 1 capsule (200 mg total) by mouth 3 (three) times a day as needed for cough.   ? budesonide-formoterol (SYMBICORT) 160-4.5 mcg/actuation inhaler Inhale 2 puffs 2 (two) times a day.   ? dextromethorphan-guaiFENesin  mg/5 mL Liqd Take 5 mL by mouth 3 (three) times a day as needed (Cough).   ? diclofenac (VOLTAREN) 50 MG EC tablet Take 1 tablet (50 mg total) by mouth 2 (two) times a day as needed.   ? fluticasone (FLONASE) 50 mcg/actuation nasal spray 1 spray into each nostril daily.   ? ibuprofen (ADVIL,MOTRIN) 200 MG tablet Take 200 mg by mouth every 6 (six) hours as needed for pain.   ? metoclopramide (REGLAN) 5 MG tablet Take 1 tablet (5 mg total) by mouth 3 (three) times a day as needed for nausea.   ? predniSONE (DELTASONE) 10 mg tablet TAKE 4 TABS BY MOUTH DAILY X 3 DAYS, THEN TAKE 2 TABS BY MOUTH DAILY X 2 DAYS, THEN STOP   ? RANITIDINE HCL (ZANTAC ORAL) Take by mouth as needed.   ? testosterone cypionate (DEPOTESTOTERONE CYPIONATE) 200 mg/mL injection Inject 0.25 mL (50 mg total) into the shoulder, thigh, or  "buttocks every 14 (fourteen) days. Every 2 weeks   ? amoxicillin (AMOXIL) 500 MG capsule Take 1 capsule (500 mg total) by mouth 3 (three) times a day for 10 days.      ______________________________________________________________________     History of present illness: Emily Chapin is a pleasant 37 y.o. male with a PMH pertinent for asthma, tobacco abuse, OA knees, postconcussion syndrome, and depression who presents in clinic today for increasing shortness of breath and productive cough.  I had just recently seen him on 10/25/2017 for similar symptoms but now have seemed to have increased.  He gets a \"big glob of mucus\" in the back of his throat that is difficult to expectorate.  He is also had persisting shortness of breath for the last 3 weeks and chest tightness. He will start the prednisone and adjust this based on anxiety symptoms, start amoxicillin course, cough syrup as needed, and use albuterol nebulizer as needed.    Review of systems:  On ROS, the patient denies any fever, body aches, sore throat, chest pain or pressure.  Positive for: increased shortness of breath, pleuritic pain right subscapular region with deep inspiration, chest tightness, postnasal drip and symptoms as noted int the HPI..     ______________________________________________________________________     /82  Pulse 88  Resp 16  Ht 5' 6\" (1.676 m)  Wt (!) 261 lb (118.4 kg)  SpO2 97%  BMI 42.13 kg/m2    Exam:  Patient is mildly uncomfortable, but in no apparent distress.  Mood good.  Insight good.  Nose exam shows mild rhinitis.  Throat - no erythema, exudate, or thrush.  Neck is supple, there is mild bilateral cervical adenopathy.  No thyromegaly.  Heart regular rate and rhythm.  Lungs are clear to auscultation bilaterally.  Respiratory effort is good.     Diagnostics:  [facility] RADIOLOGY [place]  X-RAY CHEST, 2 VIEWS, FRONTAL AND LATERAL  11/3/2017 1:15 PM     INDICATION: INCREASE SHORTNESS OF BREATH, CHEST " TIGHTNESS.  COMPARISON: None.     FINDINGS: Negative chest.      Jeffrey Smallwood, CNP  Internal Medicine  [facility]

## 2021-07-04 ENCOUNTER — HEALTH MAINTENANCE LETTER (OUTPATIENT)
Age: 41
End: 2021-07-04

## 2021-08-22 NOTE — PROGRESS NOTES
"NEUROPSYCHOLOGICAL CONCUSSION CONSULTATION  [facility]    NAME: Emily Chapin    YOB: 1980   AGE: 36  EDU: 13  DATE OF EVALUATION: 1/18/2017    REASON FOR REFERRAL:   Emily Chapin (prefers to go by Wilmer) is a 36 y.o., right-handed,  transgender male who presents to the [facility] for further evaluation and management of a concussion injury he sustained on October 8th.  He was referred for neuropsychological consultation by RE Ramirez to provide information regarding cognitive and emotional functioning following his mild brain injury. The circumstances surrounding Wilmer's injury are well-documented in the electronic medical record; therefore, only the most pertinent details will be reiterated below.    RELEVANT HISTORY:   Wilmer reported that he had been visiting [place] in October with his sister and nephew (per records October 8th) when he \"tripped over a small child,\" tried to grab onto a nearby barrel to steady himself but still fell to the ground and the empty metal barrel fell on top of him striking his head.  His sister indicated that he sustained a brief loss of consciousness (less than 30 seconds) and when he came to, he indicated that initially his head hurt, but that he otherwise felt \"fine\". However, he described later experiencing a \"massive headache.\"    As symptoms persisted, Wilmer presented to urgent care on October 13th and a CT head scan was conducted at the time and was read as unremarkable (per Care Everywhere).  He was told to follow up with his primary care provider which he did on October 20th.  At that time, amitriptyline and Reglan were prescribed for headaches and nausea respectively.  His PCP also recommended follow-up in the [facility] concussion clinic  Wilmer stated that his doctor then recommended that he be held off work for 2 weeks followed by a period of part-time work at 4 hours a day.  However, " when he attempted to return after 2 weeks, he was told that his job was no longer available. As, at this point he no longer had health insurance, he was not able to follow-up in our concussion clinic until recently.     Wilmer was seen by RE Ramirez on January 17th. As he was just seen yesterday, her notes are not yet completed, but per the record, and per Wilmer, recommendations were made for referrals for physical therapy, neuropsychology, speech therapy and psychotherapy.  Ms. Liz also recommended that he take half the dose of amitriptyline as Wilmer was noting that he was too tired in the morning when he took the full dose.  Ms. Liz also recommended a trial of Wellbutrin.    DIAGNOSTIC SUMMARY:  An abbreviated neurocognitive evaluation was conducted and Wilmer was an engaged and cooperative participant. Optimal premorbid abilities were estimated as falling in the superior range of functioning and today's results are notable for variability in learning (ranging from mildly impaired to high average) and cognitive efficiency (ranging from low average to high average). In addition, while some scores were below expectation given his high level of premorbid functioning, there was no consistent evidence of cognitive impairment across measures of attention, speed of thinking, language, visual spatial skills, mental flexibility, verbal learning, and memory. However, on measures of emotional functioning, he endorsed severe symptoms of depression and moderate symptoms of anxiety. At the present time, there is no consistent evidence of objective cognitive dysfunction but there is evidence of a clinically significant adverse emotional reaction.      At the end of the session, I shared the results of testing with Wilmer and explained about the impact of emotional symptoms on the recovery course of concussion.  We talked in general about the time course of recovery from concussion and how untreated emotional  symptoms in particular can prolong the recovery course.  We also discussed how factors such as level of fatigue, mood and day to day stressors can lead to fluctuations in cognition and contribute to subjective symptoms of cognitive difficulty. We talked about the importance of addressing his mental health in order for him to feel more comfortable and confident in his cognitive abilities and to be ready for a successful return to full time work. But ultimately, I emphasized to Wilmer that his symptoms will improve over time. He expressed relief to hear this.     A referral has already been made to our psychologist, Dr. Castaneda, and I strongly encouraged Wilmer to keep this appointment as well as to proceed with a trial of Wellbutrin.  He was in agreement and open to these suggestions.  I also encouraged him to move forward with speech therapy as well as they may be able to provide him with strategies to support his cognitive functioning day-to-day at work.  In the meantime, Wilmer was provided with education about compensatory and organizational strategies to optimize his functioning in daily life (i.e., lists, calendar system, reminders on his phone).     At this point in time, Wilmer is demonstrating no consistent evidence of cognitive impairment and thus no further follow-up with me is needed.  He is in agreement with plans for mental health interventions which I explained would likely have the most significant positive impact on his cognitive abilities. I reassured him, however, that he is welcome to contact me at any time should additional questions or concerns arise.     DIAGNOSTIC IMPRESSIONS:  Mild Traumatic Brain Injury, resolving  Adjustment Disorder with Mixed Anxiety and Depression     Thank you for the opportunity to assist in the evaluation and care of Mr. Chapin. Please do not hesitate to contact me with any questions regarding my findings or recommendations.    --------------------------------EXTENDED  5 REPORT--------------------------------  Verbal consent for today's services was received following the provision of information about the nature of the evaluation, and the opportunity to ask questions.     HISTORY OF PRESENT PROBLEM:  With regard to cognitive symptoms, Wilmer reported that his main concern relates to difficulties with word finding.  He noted that directly after the head injury, he felt that his speech was slurred for several weeks and he had significant difficulties articulating himself and coming up with the right words.  He noted that while his slurring and word finding have improved, he continues to have difficulties with word finding day-to-day.  He denied ongoing difficulties with slurring.  He also noted that he has some difficulty learning new information.  He cites the example of recently completing a 5 week training for his new job at a [employer] center and having difficulty carrying over what he learned to his actual work.  He also described difficulties with slowed thinking and increased problems with attention and focus as the day progresses.    In terms of emotional symptoms, Wilmer reported that he has been more emotionally reactive and acknowledge symptoms of both anxiety and depression that have emerged since his head injury.  He noted that his parents told him that he is also more irritable but he attributes this to his frustration at his symptoms not resolving.    Finally, with regard to physical symptoms,  Wilmer indicated that his largest concern relates to persisting daily headaches.  He noted that he does continue to experience some nausea but this only occurs a couple times a week or when he is exposed to a particularly noisy environment.  He does also endorse difficulties with sleep disturbance.  He described a history of sleep apnea and has generally noticed significant improvement with use of a CPAP machine; however, he indicated that since the concussion, the noise from the  "CPAP bothers him such that he has a hard time falling asleep at night.  He indicated that since his head injury, he feels rested only about 2 mornings each week.    Wilmer indicated that he began a new job in early December and had spent the first 5 weeks in training. He noted that training time went very well as it was a quiet environment and he had no difficulty there. However, for the last 3 weeks, he has been working \"on the floor,\" and indicated that this has been very difficult for him.  He noted in particular that it is a loud and noisy environment and he spends the whole day staring at screens and this brings on a lot of his physical symptoms.  He indicated that Ms. Liz recently reduced his work schedule such that he is now only working six-hour days on Monday, Tuesday, Thursday, and Friday.    DIAGNOSTIC STUDIES:  A recent head CT (October 13, 2016) was reportedly negative for acute intracranial pathology.     CURRENT MEDICATIONS:    Current Outpatient Prescriptions:      albuterol sulfate 90 mcg/actuation AePB, Inhale 180 mcg every 4 (four) hours as needed., Disp: , Rfl:      amitriptyline (ELAVIL) 25 MG tablet, Take 1 tablet (25 mg total) by mouth bedtime., Disp: 30 tablet, Rfl: 3     buPROPion (WELLBUTRIN XL) 150 MG 24 hr tablet, Take 1 tablet (150 mg total) by mouth daily., Disp: 31 tablet, Rfl: 1     diclofenac sodium (VOLTAREN) 1 % Gel, PJ 4 GRAMS TO KNEE QID PRN P., Disp: , Rfl: 0     ibuprofen (ADVIL,MOTRIN) 200 MG tablet, Take 200 mg by mouth every 6 (six) hours as needed for pain., Disp: , Rfl:      mometasone-formoterol (DULERA) 200-5 mcg/actuation HFAA inhaler, Inhale 2 puffs 2 (two) times a day., Disp: , Rfl:      ondansetron (ZOFRAN-ODT) 4 MG disintegrating tablet, DIS ONE T PO  Q 8 H IF NEEDED NV, Disp: , Rfl: 0     RANITIDINE HCL (ZANTAC ORAL), Take by mouth as needed., Disp: , Rfl:      TESTOSTERONE CYPIONATE IM, Inject into the shoulder, thigh, or buttocks. Every 2 weeks, Disp: , Rfl: " "     VENTOLIN HFA 90 mcg/actuation inhaler, INL 2 PFS PO Q 4 H PRN, Disp: , Rfl: 2    PAST MEDICAL HISTORY:  No past medical history on file.    No past surgical history on file.    Wilmer denied any history of prior head injury with loss of consciousness.  He did report a recent diagnosis of diabetes and a history of sleep apnea for which he uses a CPAP machine.    FAMILY MEDICAL HISTORY:  No family history on file.    PSYCHIATRIC HISTORY:  Wilmer denies any history of psychiatric diagnosis, treatment, or hospitalization. He denied suicidal ideation, plan, or intent.  He did indicate that he went through psychotherapy around age 17 related to transgender concerns.  He indicated that he is in the process of changing his name but simply needs to go and pay the fee to do so.  He stated that he would ultimately like to pursue surgical procedure as well.    SUBSTANCE USE HISTORY:  Wilmer denies any history of chemical dependency diagnosis, treatment, or hospitalization.  He indicated that he has never been a big drinker, and stopped all  alcohol consumption in August when he was diagnosed with diabetes.  He currently smokes a little less than half a pack of cigarettes a day.  He denied any recreational drug use.    RELEVANT SOCIAL HISTORY:  Wilmer reported that he has always struggled with math in school but otherwise has done well in other classes.  He indicated that his parents took him to [employer] at some point but he does not feel it helped.  He ultimately failed to graduate as he was a quarter shy of credits because he had \"failed all of my math classes.\"  He indicated that he obtained his GED a year later and this was a very easy process.  He later attended [employer] for 2 semesters majoring in anthropology, but had to leave as it was too expensive.  He described working mostly at [employer] center jobs in his adult life.  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  He has been working at his current job, another [email or web address] center, for a little under 2 months.    Wilmer indicated that he has never been  and has no children.    MENTAL STATUS EXAM AND BEHAVIORAL OBSERVATIONS:  Wilmer arrived 15 minutes early and unaccompanied to today's appointment. He was appropriately dressed and groomed. He was alert and oriented to person, place and most of the date (stated it was the 19th). His mood was dysphoric and his affect was restricted. It brightened notably at the end of the evaluation when we discussed test results. Rapport was easily established and eye contact was unremarkable.  He was pleasant and cooperative. Rate, prosody, and content of speech were grossly normal. There was no evidence of a josefina thought disorder; no hallucinations or delusions were apparent. Judgment and insight appeared fair. Wilmer appeared adequately motivated and engaged easily in the testing component of the evaluation.  The following is judged to be a valid representation of his current pattern of cognitive strengths and weaknesses.    TESTS ADMINISTERED:   DKEFS Trails, DKEFS Verbal Fluency (Letter Fluency), Generalized Anxiety Disorder-7 (JOSE-7), Patient Health Questionnaire (PHQ-9), Repeatable Battery for the Assessment of Neuropsychological Status-Form C (RBANS), WRAT-4 Word Reading    DESCRIPTIVE PERFORMANCE KEY:  Scores at the 9th percentile and above are generally considered within normal limits:  Superior scores:  91st percentile and above  High Average scores:       75th through 90th percentile  Average scores:                 25th through 74th percentile  Low Average scores:         9th through 24th percentile    Scores that fall at the 8th percentile and below are considered a degree of impairment:  Mildly Impaired:  3rd through 8th percentile  Moderately Impaired:  1st through 2nd percentile  Severely Impaired:  <1st  percentile    ESTIMATED OPTIMAL PREMORBID FUNCTIONING:  Optimal premorbid intellectual abilities were estimated as falling in the superior range based on Wilmer's educational and occupational histories and performance on a task least likely to be affected by acquired brain dysfunction (i.e., a  hold test ).    TEST RESULTS:  Auditory attention and working memory performances fell in the superior range of functioning.  Specifically, he was able to immediately recall up to 9 digits presented auditorily.    Comprehension appeared fully intact.  Confrontation naming was fully intact.  His performance on a measure of semantic verbal fluency fell in the average range of functioning overall. Phonemic fluency was assessed in the average range.       Cognitive speed and processing accuracy fell in the low average range of functioning on a task that required him to use numbers to rapidly decode a series of abstract symbols.  His performance fell in the low average range on a visual scanning task, the average range on a simple number sequencing task, and a simple letter sequencing task, and the high average range on a motor speed task. His performance was in the average range on a more difficult set-shifting task that required him to alternate between numbers and letters.     Visual attention and spatial localization skills were average. His copy of a complex figure was performed in the low average range.     With regard to learning and memory, Wilmer was administered a measure of rote auditory verbal list learning that required him to learn a series of 10 words over trials and retain and recall them over a long delay.  His initial rate of learning was variable (7,10,8,9) but fell in the high average range of functioning. He retained and recalled 8 words after the delay for average delayed recall performance.  Recognition memory was fully intact.  Contextual auditory verbal learning abilities were mildly impaired as he learned  6 and 9 details over two trials. After a delay, he retained and recalled 8 details for low average delayed recall performance. Delayed nonverbal memory for a complex figure was assessed in the low average range.     On the Patient Health Questionnaire-9, a self report measure of depressive symptomatology, he obtained a score of 16, placing him in the range of severe depression.    On the Generalized Anxiety Disorder-7, a self-report measure of anxiety, he obtained a score of 9,  placing him in the range of moderately severe anxiety.     EVALUATION SERVICES AND TIME:   Pertinent information was obtained by reviewing the electronic medical record as well as through an individual interview I conducted with the patient. I selected, integrated and interpreted the tests, and generated this report.     Diagnosis: Mild traumatic brain injury, sequela   Adjustment Disorder with Mixed Anxiety and Depression    For diagnostic and coding purposes, Mr. Chapin has a history of mild traumatic brain injury and was referred for an evaluation of Mild Neurocognitive Disorder.     24354: 1 unit of the neuropsychologists time was spent in neurobehavioral status exam  32225: 2 units of the neuropsychologists time was spent in medical record review, assisting with scoring, interpretation and integration of all tests, report preparation and provision of education and feedback at the completion of today's appointment  04122: 1 unit of the technician's time was spent in the administration and scoring of the testing battery    Annie Gamble, PhD, LP, ABPP  Clinical Neuropsychologist, LP# [id number]  Board Certified in Clinical [place] [address]  Phone: [phone]

## 2021-10-24 ENCOUNTER — HEALTH MAINTENANCE LETTER (OUTPATIENT)
Age: 41
End: 2021-10-24

## 2022-07-31 ENCOUNTER — HEALTH MAINTENANCE LETTER (OUTPATIENT)
Age: 42
End: 2022-07-31

## 2022-10-15 ENCOUNTER — HEALTH MAINTENANCE LETTER (OUTPATIENT)
Age: 42
End: 2022-10-15

## 2023-08-20 ENCOUNTER — HEALTH MAINTENANCE LETTER (OUTPATIENT)
Age: 43
End: 2023-08-20